# Patient Record
Sex: FEMALE | Race: OTHER | NOT HISPANIC OR LATINO | URBAN - METROPOLITAN AREA
[De-identification: names, ages, dates, MRNs, and addresses within clinical notes are randomized per-mention and may not be internally consistent; named-entity substitution may affect disease eponyms.]

---

## 2017-06-23 ENCOUNTER — EMERGENCY (EMERGENCY)
Facility: HOSPITAL | Age: 37
LOS: 1 days | Discharge: HOME | End: 2017-06-23
Attending: EMERGENCY MEDICINE | Admitting: EMERGENCY MEDICINE
Payer: MEDICAID

## 2017-06-23 VITALS
RESPIRATION RATE: 17 BRPM | SYSTOLIC BLOOD PRESSURE: 128 MMHG | DIASTOLIC BLOOD PRESSURE: 83 MMHG | OXYGEN SATURATION: 97 % | TEMPERATURE: 98 F | HEART RATE: 78 BPM

## 2017-06-23 VITALS
HEART RATE: 83 BPM | DIASTOLIC BLOOD PRESSURE: 79 MMHG | TEMPERATURE: 98 F | RESPIRATION RATE: 16 BRPM | SYSTOLIC BLOOD PRESSURE: 137 MMHG | OXYGEN SATURATION: 99 %

## 2017-06-23 LAB
ALBUMIN SERPL ELPH-MCNC: 4.2 G/DL — SIGNIFICANT CHANGE UP (ref 3.3–5)
ALP SERPL-CCNC: 54 U/L — SIGNIFICANT CHANGE UP (ref 40–120)
ALT FLD-CCNC: 23 U/L RC — SIGNIFICANT CHANGE UP (ref 10–45)
ANION GAP SERPL CALC-SCNC: 11 MMOL/L — SIGNIFICANT CHANGE UP (ref 5–17)
AST SERPL-CCNC: 20 U/L — SIGNIFICANT CHANGE UP (ref 10–40)
BASOPHILS # BLD AUTO: 0.1 K/UL — SIGNIFICANT CHANGE UP (ref 0–0.2)
BASOPHILS NFR BLD AUTO: 0.7 % — SIGNIFICANT CHANGE UP (ref 0–2)
BILIRUB SERPL-MCNC: 0.2 MG/DL — SIGNIFICANT CHANGE UP (ref 0.2–1.2)
BUN SERPL-MCNC: 11 MG/DL — SIGNIFICANT CHANGE UP (ref 7–23)
CALCIUM SERPL-MCNC: 9.5 MG/DL — SIGNIFICANT CHANGE UP (ref 8.4–10.5)
CHLORIDE SERPL-SCNC: 102 MMOL/L — SIGNIFICANT CHANGE UP (ref 96–108)
CO2 SERPL-SCNC: 25 MMOL/L — SIGNIFICANT CHANGE UP (ref 22–31)
CREAT SERPL-MCNC: 0.66 MG/DL — SIGNIFICANT CHANGE UP (ref 0.5–1.3)
EOSINOPHIL # BLD AUTO: 0.1 K/UL — SIGNIFICANT CHANGE UP (ref 0–0.5)
EOSINOPHIL NFR BLD AUTO: 1.1 % — SIGNIFICANT CHANGE UP (ref 0–6)
GLUCOSE SERPL-MCNC: 101 MG/DL — HIGH (ref 70–99)
HCT VFR BLD CALC: 35.9 % — SIGNIFICANT CHANGE UP (ref 34.5–45)
HGB BLD-MCNC: 12.3 G/DL — SIGNIFICANT CHANGE UP (ref 11.5–15.5)
LYMPHOCYTES # BLD AUTO: 3.9 K/UL — HIGH (ref 1–3.3)
LYMPHOCYTES # BLD AUTO: 39.8 % — SIGNIFICANT CHANGE UP (ref 13–44)
MAGNESIUM SERPL-MCNC: 2.2 MG/DL — SIGNIFICANT CHANGE UP (ref 1.6–2.6)
MCHC RBC-ENTMCNC: 27.1 PG — SIGNIFICANT CHANGE UP (ref 27–34)
MCHC RBC-ENTMCNC: 34.4 GM/DL — SIGNIFICANT CHANGE UP (ref 32–36)
MCV RBC AUTO: 78.8 FL — LOW (ref 80–100)
MONOCYTES # BLD AUTO: 0.6 K/UL — SIGNIFICANT CHANGE UP (ref 0–0.9)
MONOCYTES NFR BLD AUTO: 6.6 % — SIGNIFICANT CHANGE UP (ref 2–14)
NEUTROPHILS # BLD AUTO: 5 K/UL — SIGNIFICANT CHANGE UP (ref 1.8–7.4)
NEUTROPHILS NFR BLD AUTO: 51.8 % — SIGNIFICANT CHANGE UP (ref 43–77)
PHOSPHATE SERPL-MCNC: 4.4 MG/DL — SIGNIFICANT CHANGE UP (ref 2.5–4.5)
PLATELET # BLD AUTO: 266 K/UL — SIGNIFICANT CHANGE UP (ref 150–400)
POTASSIUM SERPL-MCNC: 4.3 MMOL/L — SIGNIFICANT CHANGE UP (ref 3.5–5.3)
POTASSIUM SERPL-SCNC: 4.3 MMOL/L — SIGNIFICANT CHANGE UP (ref 3.5–5.3)
PROT SERPL-MCNC: 7.3 G/DL — SIGNIFICANT CHANGE UP (ref 6–8.3)
RBC # BLD: 4.56 M/UL — SIGNIFICANT CHANGE UP (ref 3.8–5.2)
RBC # FLD: 13.6 % — SIGNIFICANT CHANGE UP (ref 10.3–14.5)
SODIUM SERPL-SCNC: 138 MMOL/L — SIGNIFICANT CHANGE UP (ref 135–145)
WBC # BLD: 9.7 K/UL — SIGNIFICANT CHANGE UP (ref 3.8–10.5)
WBC # FLD AUTO: 9.7 K/UL — SIGNIFICANT CHANGE UP (ref 3.8–10.5)

## 2017-06-23 PROCEDURE — 99285 EMERGENCY DEPT VISIT HI MDM: CPT | Mod: 25

## 2017-06-23 PROCEDURE — 80053 COMPREHEN METABOLIC PANEL: CPT

## 2017-06-23 PROCEDURE — 73630 X-RAY EXAM OF FOOT: CPT

## 2017-06-23 PROCEDURE — 93971 EXTREMITY STUDY: CPT | Mod: 26

## 2017-06-23 PROCEDURE — 83735 ASSAY OF MAGNESIUM: CPT

## 2017-06-23 PROCEDURE — 73630 X-RAY EXAM OF FOOT: CPT | Mod: 26,RT

## 2017-06-23 PROCEDURE — 93971 EXTREMITY STUDY: CPT

## 2017-06-23 PROCEDURE — 99284 EMERGENCY DEPT VISIT MOD MDM: CPT | Mod: 25

## 2017-06-23 PROCEDURE — 84100 ASSAY OF PHOSPHORUS: CPT

## 2017-06-23 PROCEDURE — 85027 COMPLETE CBC AUTOMATED: CPT

## 2017-06-23 RX ORDER — ACETAMINOPHEN 500 MG
975 TABLET ORAL ONCE
Qty: 0 | Refills: 0 | Status: COMPLETED | OUTPATIENT
Start: 2017-06-23 | End: 2017-06-23

## 2017-06-23 RX ADMIN — Medication 975 MILLIGRAM(S): at 02:16

## 2017-06-23 RX ADMIN — Medication 975 MILLIGRAM(S): at 05:58

## 2017-06-23 NOTE — ED PROVIDER NOTE - OBJECTIVE STATEMENT
37yo female PMH DM presenting with left-sided lower extremity numbness and tingling since yesterday morning. Recently started on diabetes and started on Debicon yesterday. Patient was sitting in school when she noticed that her left great toe felt tingling.     PMD: Nnamdi Ibrahim ([facility]) 37yo female PMH DM, HLD presenting with left-sided lower extremity numbness and tingling since yesterday morning. Recently started on diabetes and started on Debicon yesterday. Patient was sitting in school when she noticed that her left great toe felt numb, also c/o right ankle swelling. No fevers or chills, no chest pain or shortness of breath, no n/v/d. Denies trauma. Admits to standing a lot over last several days. No weakness. No back pain.     P[facility]: [facility]

## 2017-06-23 NOTE — ED PROVIDER NOTE - PROGRESS NOTE DETAILS
The patient has the capacity to refuse further medical evaluation and care. I had a lengthy discussion with the patient in which appropriate further evaluation and treatment was offered to the patient, and they declined. The patient understands that as a consequence of this decision they may be at risk for clinical deterioration including permanent disability and death, and the patient verbalized this understanding. Clear return precautions were discussed. The patient was urged to seek follow-up care, with appropriate referrals made as needed.

## 2017-06-23 NOTE — ED PROVIDER NOTE - MEDICAL DECISION MAKING DETAILS
35yo female PMH DM, hyperlipidemia recently started on Debicon presenting with isolated left great toe paresthesia and right ankle swelling, etiology likely 2/2 standing all day yesterday however will obtain bloodwork to r/o electrolyte abnormality, xray, ultrasound RLE, reassess. If negative workup follow up with PMD. Seema Eaton DO

## 2017-06-23 NOTE — ED PROVIDER NOTE - PHYSICAL EXAMINATION
Gen: NAD, AOx3  Head: NCAT  Lung: CTAB, no respiratory distress, no wheezing, rales, rhonchi  CV: normal s1/s2, rrr, no murmurs, Normal perfusion, pulses 2+ throughout  Abd: soft, NTND, no CVA tenderness  MSK: trace edema right ankle, no visible deformities, full range of motion in all 4 extremities  Neuro: CN II-XII grossly intact, No focal neurologic deficits, sensation intact, strength 5/5 bilateral lower extremities  Skin: No rash   Psych: normal affect

## 2017-06-23 NOTE — ED ADULT NURSE NOTE - CHPI ED SYMPTOMS NEG
no nausea/no weakness/no change in level of consciousness/no fever/no dizziness/no confusion/no blurred vision/no loss of consciousness/no vomiting

## 2017-06-23 NOTE — ED ADULT NURSE NOTE - OBJECTIVE STATEMENT
pt c/o numbness to both feet since yesterday while sitting upright in chair in class. No injury. pain to rt dorsalis pedal. decreased sensation to left toes compared to rt. No CP/SOB/Neck or back pain.

## 2017-06-23 NOTE — ED POST DISCHARGE NOTE - REASON FOR FOLLOW-UP
Other Pt presented c/o of numbness in great toe d/ilan at 7am on 6/23 nl foot xray and labs. Pt contacted ED regarding results of US. Informed pt of negative DVT study. Also stated that numbness has returned and is now in the back of the thigh, pt recently standing for extended period of time at graduation service. Informed that symptoms are most likely due to standing for a long time and to monitor  symptoms. Pt to follow up w/ PMD, if symptoms worsen or do not resolve w/ rest pt to return to ED.

## 2017-06-23 NOTE — ED PROVIDER NOTE - ATTENDING CONTRIBUTION TO CARE
36 yof hld, recent dx of dm started on oral dm med (unsure exactly which medication) presents with complaint of numbness to left foot in web space between great and second toe since yesterday. pt states she has been standing a lot in her flat shoes, has not been wearing sandals. denies injury or strain to the area. pt also reports right ankle swelling. no calf pain or swelling, no recent prolonged immobilization or travel. pt states she was worried it was related to her diabetes.     ROS:   constitutional - no fever, no chills  eyes - no visual changes, no redness  eent - no sore throat, no nasal congestion  cvs - no chest pain, no leg swelling  resp - no shortness of breath, no cough  gi - no abdominal pain, no vomiting, no diarrhea  gu - no dysuria, no hematuria  msk - no acute back pain, no joint swelling  skin - no rashes, no jaundice  neuro - no headache, no focal weakness  psych - no acute mental health issue     Physical Exam:   constitutional - well appearing, awake and alert, oriented x3  head - no external evidence of trauma  cvs - rrr, no murmurs, no peripheral edema. very minimal swelling of right ankle. no calf tendeness or palpable cords.   resp - breath sounds clear and equal bilat  gi - abdomen soft and nontender, no rigidity, guarding or rebound, bowel sounds present  msk - moving all extremities spontaneously  neuro - alert and oriented x3, no focal deficits, CNs 2-12 grossly intact. very minimal numbness to very small area of upper foot in web space between great and second toe. otherwise, sensation and motor intact to bilat feet. gait stable without ataxia. no pronator drift. strength in bilat upper ext wnl.   skin- no jaundice, warm and dry  psych - mood and affect wnl, no apparent risk to self or others     ? mild neuoropathy on left foot from standing in unsupportive shoes. mild nearly imperceptible swelling to right ankle - suspicion for dvt is low, however pt very concerned about it so will do dvt study.   xray left foot wnl, dvt study done but pending result. pt did not want to wait in ED for results of study and preferred to sign out ama. r/b/a of decision discussed including possible positive study requiring treatment, clinical worsening, death, or disability. pt currently with decision making capacity and able to understand r/b/a - still wants to sign out ama. additional verbal instructions regarding diagnosis, return precautions and follow up plan given to pt and/or family. JESSIKA Becerril MD

## 2019-02-07 ENCOUNTER — EMERGENCY (EMERGENCY)
Facility: HOSPITAL | Age: 39
LOS: 1 days | Discharge: HOME | End: 2019-02-07
Attending: EMERGENCY MEDICINE
Payer: COMMERCIAL

## 2019-02-07 VITALS
WEIGHT: 190.04 LBS | HEIGHT: 65 IN | RESPIRATION RATE: 16 BRPM | DIASTOLIC BLOOD PRESSURE: 80 MMHG | OXYGEN SATURATION: 100 % | TEMPERATURE: 98 F | HEART RATE: 92 BPM | SYSTOLIC BLOOD PRESSURE: 135 MMHG

## 2019-02-07 LAB
BASOPHILS # BLD AUTO: 0.1 K/UL — SIGNIFICANT CHANGE UP (ref 0–0.2)
BASOPHILS NFR BLD AUTO: 0.5 % — SIGNIFICANT CHANGE UP (ref 0–2)
EOSINOPHIL # BLD AUTO: 0.2 K/UL — SIGNIFICANT CHANGE UP (ref 0–0.5)
EOSINOPHIL NFR BLD AUTO: 1.5 % — SIGNIFICANT CHANGE UP (ref 0–6)
HCT VFR BLD CALC: 39.2 % — SIGNIFICANT CHANGE UP (ref 34.5–45)
HGB BLD-MCNC: 13.2 G/DL — SIGNIFICANT CHANGE UP (ref 11.5–15.5)
LYMPHOCYTES # BLD AUTO: 4.8 K/UL — HIGH (ref 1–3.3)
LYMPHOCYTES # BLD AUTO: 45.9 % — HIGH (ref 13–44)
MCHC RBC-ENTMCNC: 25.6 PG — LOW (ref 27–34)
MCHC RBC-ENTMCNC: 33.7 GM/DL — SIGNIFICANT CHANGE UP (ref 32–36)
MCV RBC AUTO: 76.1 FL — LOW (ref 80–100)
MONOCYTES # BLD AUTO: 0.6 K/UL — SIGNIFICANT CHANGE UP (ref 0–0.9)
MONOCYTES NFR BLD AUTO: 5.6 % — SIGNIFICANT CHANGE UP (ref 2–14)
NEUTROPHILS # BLD AUTO: 4.8 K/UL — SIGNIFICANT CHANGE UP (ref 1.8–7.4)
NEUTROPHILS NFR BLD AUTO: 46.5 % — SIGNIFICANT CHANGE UP (ref 43–77)
PLATELET # BLD AUTO: 308 K/UL — SIGNIFICANT CHANGE UP (ref 150–400)
RBC # BLD: 5.15 M/UL — SIGNIFICANT CHANGE UP (ref 3.8–5.2)
RBC # FLD: 14.2 % — SIGNIFICANT CHANGE UP (ref 10.3–14.5)
WBC # BLD: 10.3 K/UL — SIGNIFICANT CHANGE UP (ref 3.8–10.5)
WBC # FLD AUTO: 10.3 K/UL — SIGNIFICANT CHANGE UP (ref 3.8–10.5)

## 2019-02-07 PROCEDURE — 99281 EMR DPT VST MAYX REQ PHY/QHP: CPT

## 2019-02-07 PROCEDURE — 99284 EMERGENCY DEPT VISIT MOD MDM: CPT | Mod: 25

## 2019-02-07 NOTE — ED ADULT NURSE NOTE - OBJECTIVE STATEMENT
39 y/o female, a&o x3, c/o increased vaginal bleeding. Pt has had chronic vaginal bleeding x2 years, but today involves lower abd pain. Denies headache, weakness dizziness, fevers, chills, or chest pains. Breathing even, full, unlabored, no cough, no SOB. Abdomen soft, pain of lower quadrants, denies nausea/vomiting/constipation/diarrhea/urinary complaints. Skin warm/dry/intact, no edema. Stretcher locked in low position. Advised of plan of care. Family at bedside.

## 2019-02-07 NOTE — ED ADULT NURSE NOTE - NSIMPLEMENTINTERV_GEN_ALL_ED
Implemented All Universal Safety Interventions:  [place] to call system. Call bell, personal items and telephone within reach. Instruct patient to call for assistance. Room bathroom lighting operational. Non-slip footwear when patient is off stretcher. Physically safe environment: no spills, clutter or unnecessary equipment. Stretcher in lowest position, wheels locked, appropriate side rails in place.

## 2019-02-08 VITALS
OXYGEN SATURATION: 97 % | RESPIRATION RATE: 16 BRPM | DIASTOLIC BLOOD PRESSURE: 67 MMHG | HEART RATE: 72 BPM | SYSTOLIC BLOOD PRESSURE: 101 MMHG

## 2019-02-08 DIAGNOSIS — N93.9 ABNORMAL UTERINE AND VAGINAL BLEEDING, UNSPECIFIED: ICD-10-CM

## 2019-02-08 LAB
ALBUMIN SERPL ELPH-MCNC: 4.3 G/DL — SIGNIFICANT CHANGE UP (ref 3.3–5)
ALP SERPL-CCNC: 72 U/L — SIGNIFICANT CHANGE UP (ref 40–120)
ALT FLD-CCNC: 15 U/L — SIGNIFICANT CHANGE UP (ref 10–45)
ANION GAP SERPL CALC-SCNC: 14 MMOL/L — SIGNIFICANT CHANGE UP (ref 5–17)
APPEARANCE UR: CLEAR — SIGNIFICANT CHANGE UP
AST SERPL-CCNC: 14 U/L — SIGNIFICANT CHANGE UP (ref 10–40)
BACTERIA # UR AUTO: NEGATIVE — SIGNIFICANT CHANGE UP
BASOPHILS # BLD AUTO: 0.1 K/UL — SIGNIFICANT CHANGE UP (ref 0–0.2)
BASOPHILS NFR BLD AUTO: 0.9 % — SIGNIFICANT CHANGE UP (ref 0–2)
BILIRUB SERPL-MCNC: 0.1 MG/DL — LOW (ref 0.2–1.2)
BILIRUB UR-MCNC: NEGATIVE — SIGNIFICANT CHANGE UP
BLD GP AB SCN SERPL QL: NEGATIVE — SIGNIFICANT CHANGE UP
BUN SERPL-MCNC: 16 MG/DL — SIGNIFICANT CHANGE UP (ref 7–23)
CALCIUM SERPL-MCNC: 9.8 MG/DL — SIGNIFICANT CHANGE UP (ref 8.4–10.5)
CHLORIDE SERPL-SCNC: 100 MMOL/L — SIGNIFICANT CHANGE UP (ref 96–108)
CO2 SERPL-SCNC: 24 MMOL/L — SIGNIFICANT CHANGE UP (ref 22–31)
COLOR SPEC: SIGNIFICANT CHANGE UP
CREAT SERPL-MCNC: 0.68 MG/DL — SIGNIFICANT CHANGE UP (ref 0.5–1.3)
DIFF PNL FLD: ABNORMAL
EOSINOPHIL # BLD AUTO: 0.2 K/UL — SIGNIFICANT CHANGE UP (ref 0–0.5)
EOSINOPHIL NFR BLD AUTO: 2 % — SIGNIFICANT CHANGE UP (ref 0–6)
EPI CELLS # UR: 3 /HPF — SIGNIFICANT CHANGE UP
GLUCOSE SERPL-MCNC: 104 MG/DL — HIGH (ref 70–99)
GLUCOSE UR QL: NEGATIVE — SIGNIFICANT CHANGE UP
HCT VFR BLD CALC: 36.1 % — SIGNIFICANT CHANGE UP (ref 34.5–45)
HGB BLD-MCNC: 12.6 G/DL — SIGNIFICANT CHANGE UP (ref 11.5–15.5)
HYALINE CASTS # UR AUTO: 1 /LPF — SIGNIFICANT CHANGE UP (ref 0–2)
KETONES UR-MCNC: NEGATIVE — SIGNIFICANT CHANGE UP
LEUKOCYTE ESTERASE UR-ACNC: ABNORMAL
LYMPHOCYTES # BLD AUTO: 4.4 K/UL — HIGH (ref 1–3.3)
LYMPHOCYTES # BLD AUTO: 46.9 % — HIGH (ref 13–44)
MCHC RBC-ENTMCNC: 26.3 PG — LOW (ref 27–34)
MCHC RBC-ENTMCNC: 34.8 GM/DL — SIGNIFICANT CHANGE UP (ref 32–36)
MCV RBC AUTO: 75.5 FL — LOW (ref 80–100)
MONOCYTES # BLD AUTO: 0.5 K/UL — SIGNIFICANT CHANGE UP (ref 0–0.9)
MONOCYTES NFR BLD AUTO: 5 % — SIGNIFICANT CHANGE UP (ref 2–14)
NEUTROPHILS # BLD AUTO: 4.2 K/UL — SIGNIFICANT CHANGE UP (ref 1.8–7.4)
NEUTROPHILS NFR BLD AUTO: 45.1 % — SIGNIFICANT CHANGE UP (ref 43–77)
NITRITE UR-MCNC: NEGATIVE — SIGNIFICANT CHANGE UP
PH UR: 6 — SIGNIFICANT CHANGE UP (ref 5–8)
PLATELET # BLD AUTO: 301 K/UL — SIGNIFICANT CHANGE UP (ref 150–400)
POTASSIUM SERPL-MCNC: 4 MMOL/L — SIGNIFICANT CHANGE UP (ref 3.5–5.3)
POTASSIUM SERPL-SCNC: 4 MMOL/L — SIGNIFICANT CHANGE UP (ref 3.5–5.3)
PROT SERPL-MCNC: 7.5 G/DL — SIGNIFICANT CHANGE UP (ref 6–8.3)
PROT UR-MCNC: SIGNIFICANT CHANGE UP
RBC # BLD: 4.78 M/UL — SIGNIFICANT CHANGE UP (ref 3.8–5.2)
RBC # FLD: 13.7 % — SIGNIFICANT CHANGE UP (ref 10.3–14.5)
RBC CASTS # UR COMP ASSIST: 535 /HPF — HIGH (ref 0–4)
RH IG SCN BLD-IMP: POSITIVE — SIGNIFICANT CHANGE UP
RH IG SCN BLD-IMP: POSITIVE — SIGNIFICANT CHANGE UP
SODIUM SERPL-SCNC: 138 MMOL/L — SIGNIFICANT CHANGE UP (ref 135–145)
SP GR SPEC: 1.01 — SIGNIFICANT CHANGE UP (ref 1.01–1.02)
UROBILINOGEN FLD QL: NEGATIVE — SIGNIFICANT CHANGE UP
WBC # BLD: 9.3 K/UL — SIGNIFICANT CHANGE UP (ref 3.8–10.5)
WBC # FLD AUTO: 9.3 K/UL — SIGNIFICANT CHANGE UP (ref 3.8–10.5)
WBC UR QL: 12 /HPF — HIGH (ref 0–5)

## 2019-02-08 PROCEDURE — 81001 URINALYSIS AUTO W/SCOPE: CPT

## 2019-02-08 PROCEDURE — 76830 TRANSVAGINAL US NON-OB: CPT

## 2019-02-08 PROCEDURE — 86901 BLOOD TYPING SEROLOGIC RH(D): CPT

## 2019-02-08 PROCEDURE — 99284 EMERGENCY DEPT VISIT MOD MDM: CPT | Mod: 25

## 2019-02-08 PROCEDURE — 86900 BLOOD TYPING SEROLOGIC ABO: CPT

## 2019-02-08 PROCEDURE — 86850 RBC ANTIBODY SCREEN: CPT

## 2019-02-08 PROCEDURE — 80053 COMPREHEN METABOLIC PANEL: CPT

## 2019-02-08 PROCEDURE — 76830 TRANSVAGINAL US NON-OB: CPT | Mod: 26

## 2019-02-08 PROCEDURE — 85027 COMPLETE CBC AUTOMATED: CPT

## 2019-02-08 NOTE — ED PROVIDER NOTE - NS ED ROS FT
General: No fevers / chills  HENT: No head trauma, ear pain, runny nose, or sore throat  Eyes: No visual changes  CP: No chest pain, palpitations, or light headedness  Resp: No shortness of breath, no cough  GI: No abdominal pain, diarrhea, constipation, nausea, or vomiting  : +vaginal bleeding   Neuro: No numbness, tingling, or weakness

## 2019-02-08 NOTE — ED PROVIDER NOTE - PMH
Diabetes    No pertinent past medical history <<----- Click to add NO pertinent Past Medical History

## 2019-02-08 NOTE — CONSULT NOTE ADULT - SUBJECTIVE AND OBJECTIVE BOX
GYN Consult Note     38y  w/ LMP 2/5 and hx of dysfunctional uterine bleeding presents with increased vaginal bleeding and intermittent dizziness. Patient reports a two year history of heavy and frequent vaginal bleeding, occurring every 12-15 days and lasting 7-8 days. She reports on the heavier days she uses on average 8 pads per day. She reports being placed on OCP for which she is uncertain of the name, 3 months ago. While on OCP she was bleeding every 10 days but the bleeding was overall lighter. She self discontinued after 2 months due to frequent bleeding. She has not been on any other treatment for this and is due to follow up with her private GYN at the end of the month. She reports that yesterday the bleeding seemed heavier than it normally is and this concerned her so that she presented here. Reports intermittent dizziness. Denies any HA, CP/SOB, fevers, chills, abdominal pain, dysuria, constipation, diarrhea.     OB/GYN HISTORY:  - 3 FT uncomplicated C/S, 1 medical TOP. Hx of abnormal pap smears with colposcopy. Denies uterine fibroids, ovarian cysts.     Last Menstrual Period: 2 days ago     Name of GYN Physician: Dr. Saeed Bhatti      PAST MEDICAL & SURGICAL HISTORY:  Diabetes  Hyperlipidemia   History of C/S x3       REVIEW OF SYSTEMS  +vaginal bleeding, intermittent dizziness   Denies fevers, chills, CP/SOB, dysuria, constipation, diarrhea.         MEDICATIONS  (STANDING):    MEDICATIONS  (PRN):      Allergies  No Known Allergies    Intolerances      FAMILY HISTORY:  No pertinent family history in first degree relatives      Vital Signs Last 24 Hrs  T(C): 36.9 (2019 01:14), Max: 36.9 (2019 01:14)  T(F): 98.5 (2019 01:14), Max: 98.5 (2019 01:14)  HR: 92 (2019 22:30) (92 - 92)  BP: 135/80 (2019 22:30) (135/80 - 135/80)  BP(mean): --  RR: 20 (2019 01:14) (16 - 20)  SpO2: 100% (2019 01:14) (100% - 100%)    PHYSICAL EXAM:    Constitutional: alert and oriented x 3    Gastrointestinal: soft, non tender, no rebound or guarding     Genitourinary: NEFG  Cervix: closed/ long, no CMT  Uterus: normal size, non tender  Adnexa: non tender, no palpable masses        LABS:                        13.2   10.3  )-----------( 308      ( 2019 23:29 )             39.2         138  |  100  |  16  ----------------------------<  104<H>  4.0   |  24  |  0.68    Ca    9.8      2019 23:29    TPro  7.5  /  Alb  4.3  /  TBili  0.1<L>  /  DBili  x   /  AST  14  /  ALT  15  /  AlkPhos  72        Urinalysis Basic - ( 2019 00:58 )    Color: Light Yellow / Appearance: Clear / S.015 / pH: x  Gluc: x / Ketone: Negative  / Bili: Negative / Urobili: Negative   Blood: x / Protein: Trace / Nitrite: Negative   Leuk Esterase: Moderate / RBC: 535 /hpf / WBC 12 /HPF   Sq Epi: x / Non Sq Epi: 3 /hpf / Bacteria: Negative        RADIOLOGY & ADDITIONAL STUDIES: GYN Consult Note     38y  w/ LMP 2/5 and hx of dysfunctional uterine bleeding presents with increased vaginal bleeding. Patient reports a two year history of heavy and frequent vaginal bleeding, occurring every 12-15 days and lasting 7-8 days. She reports on the heavier days she uses on average 8 pads per day. She reports being placed on OCP for which she is uncertain of the name, 3 months ago. While on OCP she was bleeding every 10 days but the bleeding was overall lighter. She self discontinued after 2 months due to frequent bleeding. She has not been on any other treatment for this and is due to follow up with her private GYN at the end of the month. She reports that yesterday the bleeding seemed heavier than it normally is and this concerned her so that she presented here. Reports intermittent dizziness. Denies any HA, CP/SOB, fevers, chills, abdominal pain, dysuria, constipation, diarrhea.     OB/GYN HISTORY:  - 3 FT uncomplicated C/S, 1 medical TOP. Hx of abnormal pap smears with colposcopy. Denies uterine fibroids, ovarian cysts.     Last Menstrual Period: 2 days ago     Name of GYN Physician: Dr. Saeed Bhatti      PAST MEDICAL & SURGICAL HISTORY:  Hyperlipidemia   History of C/S x3       REVIEW OF SYSTEMS  +vaginal bleeding, intermittent dizziness   Denies fevers, chills, CP/SOB, dysuria, constipation, diarrhea.         MEDICATIONS  (STANDING):    MEDICATIONS  (PRN):      Allergies  No Known Allergies    Intolerances      FAMILY HISTORY:  No pertinent family history in first degree relatives      Vital Signs Last 24 Hrs  T(C): 36.9 (2019 01:14), Max: 36.9 (2019 01:14)  T(F): 98.5 (2019 01:14), Max: 98.5 (2019 01:14)  HR: 92 (2019 22:30) (92 - 92)  BP: 135/80 (2019 22:30) (135/80 - 135/80)  BP(mean): --  RR: 20 (2019 01:14) (16 - 20)  SpO2: 100% (2019 01:14) (100% - 100%)    PHYSICAL EXAM:    Constitutional: alert and oriented x 3    Gastrointestinal: soft, non tender, no rebound or guarding     Genitourinary: NEFG  Cervix: closed/ long, no CMT  Uterus: normal size, non tender  Adnexa: non tender, no palpable masses        LABS:                        13.2   10.3  )-----------( 308      ( 2019 23:29 )             39.2         138  |  100  |  16  ----------------------------<  104<H>  4.0   |  24  |  0.68    Ca    9.8      2019 23:29    TPro  7.5  /  Alb  4.3  /  TBili  0.1<L>  /  DBili  x   /  AST  14  /  ALT  15  /  AlkPhos  72        Urinalysis Basic - ( 2019 00:58 )    Color: Light Yellow / Appearance: Clear / S.015 / pH: x  Gluc: x / Ketone: Negative  / Bili: Negative / Urobili: Negative   Blood: x / Protein: Trace / Nitrite: Negative   Leuk Esterase: Moderate / RBC: 535 /hpf / WBC 12 /HPF   Sq Epi: x / Non Sq Epi: 3 /hpf / Bacteria: Negative        RADIOLOGY & ADDITIONAL STUDIES:  < from: US Transvaginal (19 @ 02:38) >  EXAM:  US TRANSVAGINAL                            PROCEDURE DATE:  2019            INTERPRETATION:  CLINICAL INFORMATION: Prolonged menses; vaginal bleeding    LMP: 2019    COMPARISON: CT the abdomen and pelvis acquired 2015    TECHNIQUE:     Endovaginal pelvic sonogram only. Color and Spectral Doppler was   performed.    FINDINGS:    Uterus: 9.3 x 4.6 x 6.3 cm. Within normal limits. There is a small   posterior uterine fibroid measuring 1.6 x 0.9 x 1.3 cm.    Endometrium: 13 mm. Within normal limits.    Right ovary: 2.2 x 1.6 x 2.5 cm. Within normal limits. Normal arterial   and venous flow to the ovary.    Left ovary: 2.7 x 1.8 x 2.5 cm. Within normal limits. Normal arterial and   venous flow to the ovary.    Fluid: None.    IMPRESSION:    Normal pelvic sonogram.        TYRELL WAHL M.D., RADIOLOGY RESIDENT  This document has been electronically signed.  MARILEE STEWART M.D., ATTENDING RADIOLOGIST  This document has been electronically signed. 2019  2:58AM       < end of copied text >

## 2019-02-08 NOTE — CONSULT NOTE ADULT - ATTENDING COMMENTS
This 39y/o  who presented to the ED with heavy vaginal bleeding x2 days, was discussed with me.  I have read and reviewed the above Resident's note and I agree with the assessment and plan.    Pt with hx of Abnormal uterine bleeding, most likely endometrial in nature.  Pt was placed on OCP's, which she has discontinued and is most likely why her menstrual bleeding  is heavy at this time.  H/H wnl and stable.  No active bleeding noted on exam.  Small posterior fibroid noted on sono, but most likely not cause of heavy bleeding.    Pt is stable for discharge with bleeding precautions given.  Pt to f/u with her GYN and recommend restarting OCP's or using Mirena IUD to help with heavy menses.    Thank you for the consult and please call if any further questions.    Dr. Chavez

## 2019-02-08 NOTE — ED PROVIDER NOTE - OBJECTIVE STATEMENT
39 yo  hx of dysfunctional uterine bleeding without known cause presents due to increase in bleeding. Stopped taking oral contraception 1 month ago due to side effects-now has increase in bleeding especially over the last 2 days. Soaking 9 pads daily. Denies feeling dizzy, cp, sob, headache-however on orthostatics, became tachycardic and felt dizzy.

## 2019-02-08 NOTE — ED PROVIDER NOTE - ATTENDING CONTRIBUTION TO CARE
38 yof pmhx DUB prev on OCP however stopped them without telling her obgyn as she 'didn't like the side effects' incl headache, though she does endorse they were helping w her sxs. states over last two years has had intermittent vb approx every 10 days. states bleeding became heaviery two days ago after having sexual intercourse, assoc w mild to mod pelvic cramping. no f/c. no  abnormal discharge besides bleeding. statse has had prior pelvic sono 4 mo ago which was 'totally normal' per pt , and has f/u w obgyn in one week w repeat sono planned.     ROS:   constitutional - no fever, no chills  eyes - no visual changes, no redness  eent - no sore throat, no nasal congestion  cvs - no chest pain, no leg swelling  resp - no shortness of breath, no cough  gi - no abdominal pain, no vomiting, no diarrhea  gu - no dysuria, no hematuria  msk - no acute back pain, no joint swelling  skin - no rashes, no jaundice  neuro - no headache, no focal weakness    Physical Exam:   constitutional - well appearing, awake and alert, oriented x3  head - no external evidence of trauma  cvs - rrr, no murmurs, no peripheral edema  resp - breath sounds clear and equal bilat  gi - abdomen soft and nontender, no rigidity, guarding or rebound, bowel sounds present  msk - moving all extremities spontaneously  neuro - alert and oriented x3, no focal deficits, CNs 2-12 grossly intact  skin- no jaundice, warm and dry  psych - mood and affect wnl, no apparent risk to self or others   gu - some pooling of blood in posterior fornix, os closed, no t or adnexal tenderness.     has bilat tubes tied.   ? DUB most likely, vs endometrial pathology/ malignancy (less liekly as prior US normal) vs vaginal laceration given recent sexual activity.    US nonactionable.   seen by ob who are recommending against starting provera at this time as pt has close f/u in next week. was mildly orthostatic, however  ambulating around ED w/o diff and rpt CBC no signif drop in h/h .additional verbal instructions regarding diagnosis, return precautions and follow up plan given to pt and/or family. JESSIKA Becerril MD

## 2019-02-08 NOTE — CONSULT NOTE ADULT - PROBLEM SELECTOR RECOMMENDATION 9
- No active hemorrhage on exam, Hb/Hct 13.2/39.2  - TVUS with evidence of small posterior fibroid   - Increased bleeding likely secondary to fibroid and self d/c of OCP   - Recommend patient follow up with private GYN to discuss further treatment, as she self discontinued OCP. No acute intervention warranted at this time.    D/w Dr. Hilda Luke, PGY-2 - No active hemorrhage on exam, Hb/Hct 13.2/39.2  - TVUS with evidence of small posterior fibroid   - Increased bleeding likely secondary to self d/c of OCP   - Recommend patient follow up with private GYN to discuss further treatment, as she self discontinued OCP. No acute intervention warranted at this time.    D/w Dr. Hilda Luke, PGY-2

## 2019-02-08 NOTE — ED ADULT NURSE REASSESSMENT NOTE - NS ED NURSE REASSESS COMMENT FT1
Performed orthostatic vital signs measurement. Pt reported feeling slight dizziness when transitioning from sitting to standing position. Skin warm/dry, appropriate color for ethnicity. No syncope. No weakness. No chest pains. No palpitations. Stretcher locked in low position. Advised of plan of care.

## 2019-02-08 NOTE — ED PROVIDER NOTE - MEDICAL DECISION MAKING DETAILS
dysfunctional uterine bleeding worsened over the last 2 days after stopping ocps recently, pooling of blood in the vault, TVUS/OB pending   Elenita Russo, PGY-2 EM

## 2019-02-08 NOTE — CONSULT NOTE ADULT - ASSESSMENT
38y  w/ LMP 2/5 and hx of dysfunctional uterine bleeding presents with increased vaginal bleeding and intermittent dizziness, in stable condition.

## 2019-02-08 NOTE — ED PROVIDER NOTE - CARE PLAN
Principal Discharge DX:	Vaginal bleeding Principal Discharge DX:	Vaginal bleeding  Secondary Diagnosis:	Dysfunctional uterine bleeding

## 2019-03-02 ENCOUNTER — EMERGENCY (EMERGENCY)
Facility: HOSPITAL | Age: 39
LOS: 1 days | Discharge: HOME | End: 2019-03-02
Attending: EMERGENCY MEDICINE
Payer: COMMERCIAL

## 2019-03-02 VITALS
DIASTOLIC BLOOD PRESSURE: 73 MMHG | RESPIRATION RATE: 18 BRPM | OXYGEN SATURATION: 100 % | TEMPERATURE: 99 F | SYSTOLIC BLOOD PRESSURE: 113 MMHG | HEART RATE: 89 BPM

## 2019-03-02 VITALS
HEIGHT: 66 IN | RESPIRATION RATE: 20 BRPM | SYSTOLIC BLOOD PRESSURE: 151 MMHG | HEART RATE: 99 BPM | OXYGEN SATURATION: 99 % | TEMPERATURE: 98 F | WEIGHT: 192.02 LBS | DIASTOLIC BLOOD PRESSURE: 99 MMHG

## 2019-03-02 LAB
ALBUMIN SERPL ELPH-MCNC: 4.4 G/DL — SIGNIFICANT CHANGE UP (ref 3.3–5)
ALP SERPL-CCNC: 57 U/L — SIGNIFICANT CHANGE UP (ref 40–120)
ALT FLD-CCNC: 13 U/L — SIGNIFICANT CHANGE UP (ref 10–45)
ANION GAP SERPL CALC-SCNC: 14 MMOL/L — SIGNIFICANT CHANGE UP (ref 5–17)
APTT BLD: 31.7 SEC — SIGNIFICANT CHANGE UP (ref 27.5–36.3)
AST SERPL-CCNC: 15 U/L — SIGNIFICANT CHANGE UP (ref 10–40)
BASOPHILS # BLD AUTO: 0.1 K/UL — SIGNIFICANT CHANGE UP (ref 0–0.2)
BASOPHILS NFR BLD AUTO: 0.6 % — SIGNIFICANT CHANGE UP (ref 0–2)
BILIRUB SERPL-MCNC: 0.1 MG/DL — LOW (ref 0.2–1.2)
BLD GP AB SCN SERPL QL: NEGATIVE — SIGNIFICANT CHANGE UP
BUN SERPL-MCNC: 7 MG/DL — SIGNIFICANT CHANGE UP (ref 7–23)
CALCIUM SERPL-MCNC: 9.7 MG/DL — SIGNIFICANT CHANGE UP (ref 8.4–10.5)
CHLORIDE SERPL-SCNC: 104 MMOL/L — SIGNIFICANT CHANGE UP (ref 96–108)
CO2 SERPL-SCNC: 24 MMOL/L — SIGNIFICANT CHANGE UP (ref 22–31)
CREAT SERPL-MCNC: 0.59 MG/DL — SIGNIFICANT CHANGE UP (ref 0.5–1.3)
EOSINOPHIL # BLD AUTO: 0.1 K/UL — SIGNIFICANT CHANGE UP (ref 0–0.5)
EOSINOPHIL NFR BLD AUTO: 1.1 % — SIGNIFICANT CHANGE UP (ref 0–6)
GLUCOSE SERPL-MCNC: 128 MG/DL — HIGH (ref 70–99)
HCG SERPL-ACNC: <2 MIU/ML — SIGNIFICANT CHANGE UP
HCT VFR BLD CALC: 35.9 % — SIGNIFICANT CHANGE UP (ref 34.5–45)
HGB BLD-MCNC: 12.4 G/DL — SIGNIFICANT CHANGE UP (ref 11.5–15.5)
INR BLD: 0.92 RATIO — SIGNIFICANT CHANGE UP (ref 0.88–1.16)
LYMPHOCYTES # BLD AUTO: 3.6 K/UL — HIGH (ref 1–3.3)
LYMPHOCYTES # BLD AUTO: 36.7 % — SIGNIFICANT CHANGE UP (ref 13–44)
MCHC RBC-ENTMCNC: 26.4 PG — LOW (ref 27–34)
MCHC RBC-ENTMCNC: 34.6 GM/DL — SIGNIFICANT CHANGE UP (ref 32–36)
MCV RBC AUTO: 76.3 FL — LOW (ref 80–100)
MONOCYTES # BLD AUTO: 0.6 K/UL — SIGNIFICANT CHANGE UP (ref 0–0.9)
MONOCYTES NFR BLD AUTO: 5.8 % — SIGNIFICANT CHANGE UP (ref 2–14)
NEUTROPHILS # BLD AUTO: 5.5 K/UL — SIGNIFICANT CHANGE UP (ref 1.8–7.4)
NEUTROPHILS NFR BLD AUTO: 55.8 % — SIGNIFICANT CHANGE UP (ref 43–77)
PLATELET # BLD AUTO: 271 K/UL — SIGNIFICANT CHANGE UP (ref 150–400)
POTASSIUM SERPL-MCNC: 3.6 MMOL/L — SIGNIFICANT CHANGE UP (ref 3.5–5.3)
POTASSIUM SERPL-SCNC: 3.6 MMOL/L — SIGNIFICANT CHANGE UP (ref 3.5–5.3)
PROT SERPL-MCNC: 7.9 G/DL — SIGNIFICANT CHANGE UP (ref 6–8.3)
PROTHROM AB SERPL-ACNC: 10.5 SEC — SIGNIFICANT CHANGE UP (ref 10–12.9)
RBC # BLD: 4.71 M/UL — SIGNIFICANT CHANGE UP (ref 3.8–5.2)
RBC # FLD: 14 % — SIGNIFICANT CHANGE UP (ref 10.3–14.5)
RH IG SCN BLD-IMP: POSITIVE — SIGNIFICANT CHANGE UP
SODIUM SERPL-SCNC: 142 MMOL/L — SIGNIFICANT CHANGE UP (ref 135–145)
WBC # BLD: 9.8 K/UL — SIGNIFICANT CHANGE UP (ref 3.8–10.5)
WBC # FLD AUTO: 9.8 K/UL — SIGNIFICANT CHANGE UP (ref 3.8–10.5)

## 2019-03-02 PROCEDURE — 70496 CT ANGIOGRAPHY HEAD: CPT

## 2019-03-02 PROCEDURE — 93010 ELECTROCARDIOGRAM REPORT: CPT

## 2019-03-02 PROCEDURE — 99284 EMERGENCY DEPT VISIT MOD MDM: CPT | Mod: 25

## 2019-03-02 PROCEDURE — 85610 PROTHROMBIN TIME: CPT

## 2019-03-02 PROCEDURE — 70496 CT ANGIOGRAPHY HEAD: CPT | Mod: 26

## 2019-03-02 PROCEDURE — 86901 BLOOD TYPING SEROLOGIC RH(D): CPT

## 2019-03-02 PROCEDURE — 70498 CT ANGIOGRAPHY NECK: CPT

## 2019-03-02 PROCEDURE — 70498 CT ANGIOGRAPHY NECK: CPT | Mod: 26

## 2019-03-02 PROCEDURE — 86900 BLOOD TYPING SEROLOGIC ABO: CPT

## 2019-03-02 PROCEDURE — 85730 THROMBOPLASTIN TIME PARTIAL: CPT

## 2019-03-02 PROCEDURE — 93005 ELECTROCARDIOGRAM TRACING: CPT

## 2019-03-02 PROCEDURE — 70450 CT HEAD/BRAIN W/O DYE: CPT

## 2019-03-02 PROCEDURE — 80053 COMPREHEN METABOLIC PANEL: CPT

## 2019-03-02 PROCEDURE — 85027 COMPLETE CBC AUTOMATED: CPT

## 2019-03-02 PROCEDURE — 86850 RBC ANTIBODY SCREEN: CPT

## 2019-03-02 PROCEDURE — 84702 CHORIONIC GONADOTROPIN TEST: CPT

## 2019-03-02 NOTE — ED ADULT NURSE NOTE - NSIMPLEMENTINTERV_GEN_ALL_ED
Implemented All Universal Safety Interventions:  [place] to call system. Call bell, personal items and telephone within reach. Instruct patient to call for assistance. Room bathroom lighting operational. Non-slip footwear when patient is off stretcher. Physically safe environment: no spills, clutter or unnecessary equipment. Stretcher in lowest position, wheels locked, appropriate side rails in place.

## 2019-03-02 NOTE — CONSULT NOTE ADULT - SUBJECTIVE AND OBJECTIVE BOX
HPI:    Patient is a 72F with a history of HLD, DM who presents to the ED due to dizziness and vision changes. Patient states that this afternoon, she had just finished eating when she started to feel dizzy. She denies any sensation of room-spinning and states that it was more a sensation of uneasiness. She also started to note tunnel vision that began shortly after this and felt that she had to focus to see objects and that objects in the periphery appeared more blurred. She says that her symptoms resolved upon laying down. She says that a few days ago, she was driving and had a similar sensation of unsteadiness without any visual problems at that time that resolved after pulling over and resting. She has been under some stress recently and has been experiencing headaches, although she denies any headache currently. She denies any paresthesias, focal weakness, or speech problems. She now states that she feels back to normal.       MEDICATIONS  (STANDING):    MEDICATIONS  (PRN):    PAST MEDICAL & SURGICAL HISTORY:  Prediabetes  Hyperlipidemia  No significant past surgical history    FAMILY HISTORY:  No pertinent family history in first degree relatives    Allergies    No Known Allergies    SHx - No smoking, No ETOH, No drug abuse      Review of Systems:  CONSTITUTIONAL:   HEENT:  No visual loss, blurred vision, double vision.  No hearing loss, sneezing, congestion, runny nose or sore throat.  SKIN:  No rash or itching.  CARDIOVASCULAR:  No chest pain, chest pressure or chest discomfort. No palpitations or edema.  RESPIRATORY:  No shortness of breath, cough or sputum.  GASTROINTESTINAL:  No anorexia, nausea, vomiting or diarrhea. No abdominal pain.  GENITOURINARY:  NO dysuria. No increased frequency. No retention. No incontinence.  NEUROLOGICAL:  See HPI  MUSCULOSKELETAL:  No muscle, back pain, joint pain or stiffness.  HEMATOLOGIC:  No anemia, bleeding or bruising.  PSYCHIATRIC:    ENDOCRINOLOGIC:  No reports of sweating, cold or heat intolerance. No polyuria or polydipsia.        Vital Signs Last 24 Hrs  T(C): 37.2 (02 Mar 2019 18:11), Max: 37.2 (02 Mar 2019 18:11)  T(F): 98.9 (02 Mar 2019 18:11), Max: 98.9 (02 Mar 2019 18:11)  HR: 96 (02 Mar 2019 19:13) (84 - 99)  BP: 124/79 (02 Mar 2019 19:13) (121/77 - 151/99)  BP(mean): --  RR: 18 (02 Mar 2019 19:13) (18 - 20)  SpO2: 100% (02 Mar 2019 19:13) (99% - 100%)    General Exam:   General appearance: No acute distress              Neurological Exam:  Mental Status: Orientated to self, date and place.  Attention intact.  No dysarthria. Speech fluent.  Cranial Nerves:   PERRL, EOMI, VFF, no nystagmus.    CN V1-3 intact to light touch .  No facial asymmetry.   Motor:   Tone: normal.                  Strength:     [] Upper extremity                      Delt       Bicep    Tricep                                                  R         5/5        5/5        5/5       5/5                                               L          5/5        5/5        5/5       5/5  [] Lower extremity                       HF          KE          KF        DF         PF                                               R        5/5        5/5        5/5       5/5       5/5                                               L         5/5        5/5       5/5       5/5        5/5  Pronator drift: none                 Dysmetria: None to finger-nose-finger    Sensation: intact to light touch    Deep Tendon Reflexes:     Biceps          Triceps      BR        Patellar        Ankle         Babinski                                         R       2+                   2+           2+            2+               2+           downgoing                                         L        2+                  2+           2+            2+               2+           downgoing      03-02    142  |  104  |  7   ----------------------------<  128<H>  3.6   |  24  |  0.59    Ca    9.7      02 Mar 2019 18:15    TPro  7.9  /  Alb  4.4  /  TBili  0.1<L>  /  DBili  x   /  AST  15  /  ALT  13  /  AlkPhos  57  03-02                            12.4   9.8   )-----------( 271      ( 02 Mar 2019 18:15 )             35.9       Radiology    < from: CT Head No Cont (03.02.19 @ 20:45) >  IMPRESSION:     CT brain: There is no evidence of acute intracranial hemorrhage,   extra-axial collection, vasogenic edema, mass, mass effect, midline   shift, central herniation, hydrocephalus or transcortical infarct.     CT angiography neck: No evidence of hemodynamically significant stenosis   by NASCET criteria. No evidence of vascular dissection.    CT angiography brain: No major vessel occlusion or proximal stenosis by   NASCET criteria. No evidence of aneurysm or other vascular malformation.    < end of copied text >

## 2019-03-02 NOTE — ED PROVIDER NOTE - PROGRESS NOTE DETAILS
Tong: patient received in sign-out. CTA negative. Neuro consulted, will see patient shortly. Patient amenable to staying in CDU for MRI. Beltran: neuro saw patient, recommending discharge. stable for discharge.

## 2019-03-02 NOTE — ED PROVIDER NOTE - ATTENDING CONTRIBUTION TO CARE
38 F p/w vision changes, sudden onset earlier today, no loc/syncope; + ha (mild gradual onset). no fever/chills.    On Physical Exam:  General: well appearing, in NAD, speaking clearly in full sentences and without difficulty; cooperative with exam  HEENT: PERRL, MMM  Neck: no neck tenderness, no nuchal rigidity  Cardiac: normal s1, s2; RRR; no MGR  Lungs: CTABL  Abdomen: soft nontender/nondistended  : no bladder tenderness or distension  Skin: intact, no rash  Extremities: no peripheral edema, no gross deformities  Neuro: no gross neurologic deficits    AP: Plan CT-head, CTA-head/neck to eval for posterior stroke or VBI; ECG,  labs, and consider neuro consult.

## 2019-03-02 NOTE — ED PROVIDER NOTE - OBJECTIVE STATEMENT
38F hx of HLD, pre-diabetes who presents with acute onset peripheral vision loss (right eye worse than left) and dizziness. Symptoms started 1.5 hours prior to ED visit when patient noticed that she had peripheral visual field defects on both sides, worse on right eye. This was associated with dizziness which worsened upon looking to the side. She noted that she became concerned when she was only able to see parts of her son's face (nose, mouth, chin). Symptoms improved by the time she presented to ED, however have not completely resolved. She denies any prior symptoms similar to this. Of note she has been having intermittent bilateral headaches, she has been seeing flashes of light on her right eye, and currently has occipital area numbness. She denies other focal neurological deficits, fevers, chills, cp, sob, abdominal pain, n/v/d, dysuria.

## 2019-03-02 NOTE — ED ADULT NURSE NOTE - CAS EDN DISCHARGE INTERVENTIONS
IV discontinued, cath removed intact Modified Advancement Flap Text: The defect edges were debeveled with a #15 scalpel blade.  Given the location of the defect, shape of the defect and the proximity to free margins a modified advancement flap was deemed most appropriate.  Using a sterile surgical marker, an appropriate advancement flap was drawn incorporating the defect and placing the expected incisions within the relaxed skin tension lines where possible.    The area thus outlined was incised deep to adipose tissue with a #15 scalpel blade.  The skin margins were undermined to an appropriate distance in all directions utilizing iris scissors.

## 2019-03-02 NOTE — ED PROVIDER NOTE - CLINICAL SUMMARY MEDICAL DECISION MAKING FREE TEXT BOX
38F hx of HLD, pre-diabetes who presents with acute onset peripheral vision loss (right eye worse than left) and dizziness, with acute onset 1.5 hours prior to ED visit. Improvement of symptoms by the time patient presented to ED. DDx - Migraine headache, lower suspicion for CVA however given occipital area numbness, and atypical complaints of right peripheral vision loss will obtain CT head w and w/o contrast and CT neck with contrast. Visual acuity test. Labs. Re-assess. 38F hx of HLD, pre-diabetes who presents with acute onset peripheral vision loss (right eye worse than left) and dizziness, with acute onset 1.5 hours prior to ED visit. Improvement of symptoms by the time patient presented to ED. DDx - Migraine headache, lower suspicion for CVA however given occipital area numbness, and atypical complaints of right peripheral vision loss will obtain CT head w and w/o contrast and CT neck with contrast. Visual acuity test 20/20 bilaterally, slit lamp and fundoscopic exam unremarkable. Labs. Re-assess.

## 2019-03-02 NOTE — CONSULT NOTE ADULT - ASSESSMENT
Patient is a 72F with a history of HLD, DM who presents to the ED due to dizziness and vision changes. Patient states that this afternoon, she had just finished eating when she started to feel dizzy. She denies any sensation of room-spinning and states that it was more a sensation of uneasiness. She also started to note tunnel vision that began shortly after this and felt that she had to focus to see objects and that objects in the periphery appeared more blurred. She says that her symptoms resolved upon laying down. She says that a few days ago, she was driving and had a similar sensation of unsteadiness without any visual problems at that time that resolved after pulling over and resting. She has been under some stress recently and has been experiencing headaches, although she denies any headache currently. She denies any paresthesias, focal weakness, or speech problems. She now states that she feels back to normal.     Neurological exam is unremarkable. Vital signs have been within normal limits. CTH and CTA head/neck were performed and showed no acute abnormalities.     Impression and recommendations: Symptoms could be presyncopal in nature. Patient does not need further inpatient neurological work-up as an inpatient and can follow-up outpatient with neurology if her symptoms return (call [phone] for appointment). Encourage good PO intake/hydration.

## 2019-03-02 NOTE — ED ADULT NURSE NOTE - OBJECTIVE STATEMENT
Pt is an ambulatory 74 yr old female a/oX 3 c/o peripheral vision loss in both eyes, associated with right sided head and facial numbness with dizziness and unsteady gait that started at [id number].  PERRL wnl, dillon with equal strength.  Normal steady gait upon arrival.  Visual deficits have resolved.  LUNGS CTA no chest pain or sob.  SINUS tachycardia on cm at 110 bpm.  Denies PE risk factors, c/o N/v/F, chills.  Abdomen NT ND.  No urinary symptoms.  Peripheral pulses +2bl no edema

## 2019-03-02 NOTE — CONSULT NOTE ADULT - ATTENDING COMMENTS
I spoke with the resident by phone about this patient but the patient was discharged before I could personally evaluate.

## 2019-03-02 NOTE — ED PROVIDER NOTE - PHYSICAL EXAMINATION
Grossly normal fundoscopic exam, without retinal hemorrhage or evidence of papilledema (limited, undilated exam).   Normal slit lamp exam.  Fluorescein stain revealed no areas of uptake.  No visual field deficits on confrontation visual exam.

## 2019-03-02 NOTE — ED PROVIDER NOTE - NSFOLLOWUPINSTRUCTIONS_ED_ALL_ED_FT
Your diagnosis: vision changes    Discharge instructions:    1. If your symptoms persist or worsen, call [phone] for appointment.    2. Take any prescribed medications as instructed:    3. Others:    4. Be sure to return to the ED if you develop new or worsening symptoms. Specific signs and symptoms to be vigilant of: lightheadedness, dizziness, vertigo, headache, vision changes, slurring of the speech, facial droop, difficulty swallowing, numbness or tingling, muscle weakness, changes in sensation, difficulty walking.

## 2019-03-03 PROBLEM — E11.9 TYPE 2 DIABETES MELLITUS WITHOUT COMPLICATIONS: Chronic | Status: INACTIVE | Noted: 2017-06-23 | Resolved: 2019-03-02

## 2019-03-07 ENCOUNTER — EMERGENCY (EMERGENCY)
Facility: HOSPITAL | Age: 39
LOS: 1 days | Discharge: HOME | End: 2019-03-07
Attending: EMERGENCY MEDICINE
Payer: COMMERCIAL

## 2019-03-07 VITALS
WEIGHT: 190.04 LBS | DIASTOLIC BLOOD PRESSURE: 82 MMHG | SYSTOLIC BLOOD PRESSURE: 131 MMHG | TEMPERATURE: 98 F | OXYGEN SATURATION: 100 % | RESPIRATION RATE: 18 BRPM | HEART RATE: 85 BPM | HEIGHT: 66 IN

## 2019-03-07 PROBLEM — E78.5 HYPERLIPIDEMIA, UNSPECIFIED: Chronic | Status: ACTIVE | Noted: 2019-03-02

## 2019-03-07 PROBLEM — R73.03 PREDIABETES: Chronic | Status: ACTIVE | Noted: 2019-03-02

## 2019-03-07 LAB
ALBUMIN SERPL ELPH-MCNC: 4 G/DL — SIGNIFICANT CHANGE UP (ref 3.3–5)
ALP SERPL-CCNC: 53 U/L — SIGNIFICANT CHANGE UP (ref 40–120)
ALT FLD-CCNC: 14 U/L — SIGNIFICANT CHANGE UP (ref 10–45)
ANION GAP SERPL CALC-SCNC: 13 MMOL/L — SIGNIFICANT CHANGE UP (ref 5–17)
AST SERPL-CCNC: 14 U/L — SIGNIFICANT CHANGE UP (ref 10–40)
BASOPHILS # BLD AUTO: 0 K/UL — SIGNIFICANT CHANGE UP (ref 0–0.2)
BASOPHILS NFR BLD AUTO: 0.5 % — SIGNIFICANT CHANGE UP (ref 0–2)
BILIRUB SERPL-MCNC: 0.1 MG/DL — LOW (ref 0.2–1.2)
BUN SERPL-MCNC: 9 MG/DL — SIGNIFICANT CHANGE UP (ref 7–23)
CALCIUM SERPL-MCNC: 9.7 MG/DL — SIGNIFICANT CHANGE UP (ref 8.4–10.5)
CHLORIDE SERPL-SCNC: 103 MMOL/L — SIGNIFICANT CHANGE UP (ref 96–108)
CO2 SERPL-SCNC: 24 MMOL/L — SIGNIFICANT CHANGE UP (ref 22–31)
CREAT SERPL-MCNC: 0.49 MG/DL — LOW (ref 0.5–1.3)
EOSINOPHIL # BLD AUTO: 0.1 K/UL — SIGNIFICANT CHANGE UP (ref 0–0.5)
EOSINOPHIL NFR BLD AUTO: 1.7 % — SIGNIFICANT CHANGE UP (ref 0–6)
GLUCOSE SERPL-MCNC: 126 MG/DL — HIGH (ref 70–99)
HCG SERPL-ACNC: <2 MIU/ML — SIGNIFICANT CHANGE UP
HCT VFR BLD CALC: 37.9 % — SIGNIFICANT CHANGE UP (ref 34.5–45)
HGB BLD-MCNC: 13.1 G/DL — SIGNIFICANT CHANGE UP (ref 11.5–15.5)
LYMPHOCYTES # BLD AUTO: 2.9 K/UL — SIGNIFICANT CHANGE UP (ref 1–3.3)
LYMPHOCYTES # BLD AUTO: 35.8 % — SIGNIFICANT CHANGE UP (ref 13–44)
MCHC RBC-ENTMCNC: 27.1 PG — SIGNIFICANT CHANGE UP (ref 27–34)
MCHC RBC-ENTMCNC: 34.6 GM/DL — SIGNIFICANT CHANGE UP (ref 32–36)
MCV RBC AUTO: 78.4 FL — LOW (ref 80–100)
MONOCYTES # BLD AUTO: 0.4 K/UL — SIGNIFICANT CHANGE UP (ref 0–0.9)
MONOCYTES NFR BLD AUTO: 5.3 % — SIGNIFICANT CHANGE UP (ref 2–14)
NEUTROPHILS # BLD AUTO: 4.5 K/UL — SIGNIFICANT CHANGE UP (ref 1.8–7.4)
NEUTROPHILS NFR BLD AUTO: 56.7 % — SIGNIFICANT CHANGE UP (ref 43–77)
PLATELET # BLD AUTO: 270 K/UL — SIGNIFICANT CHANGE UP (ref 150–400)
POTASSIUM SERPL-MCNC: 4.4 MMOL/L — SIGNIFICANT CHANGE UP (ref 3.5–5.3)
POTASSIUM SERPL-SCNC: 4.4 MMOL/L — SIGNIFICANT CHANGE UP (ref 3.5–5.3)
PROT SERPL-MCNC: 7.4 G/DL — SIGNIFICANT CHANGE UP (ref 6–8.3)
RBC # BLD: 4.83 M/UL — SIGNIFICANT CHANGE UP (ref 3.8–5.2)
RBC # FLD: 14.3 % — SIGNIFICANT CHANGE UP (ref 10.3–14.5)
SODIUM SERPL-SCNC: 140 MMOL/L — SIGNIFICANT CHANGE UP (ref 135–145)
WBC # BLD: 8 K/UL — SIGNIFICANT CHANGE UP (ref 3.8–10.5)
WBC # FLD AUTO: 8 K/UL — SIGNIFICANT CHANGE UP (ref 3.8–10.5)

## 2019-03-07 PROCEDURE — 76512 OPH US DX B-SCAN: CPT | Mod: 26,LT

## 2019-03-07 PROCEDURE — 99218: CPT

## 2019-03-07 RX ORDER — ACETAMINOPHEN 500 MG
650 TABLET ORAL ONCE
Qty: 0 | Refills: 0 | Status: COMPLETED | OUTPATIENT
Start: 2019-03-07 | End: 2019-03-07

## 2019-03-07 RX ORDER — METOCLOPRAMIDE HCL 10 MG
10 TABLET ORAL EVERY 6 HOURS
Qty: 0 | Refills: 0 | Status: DISCONTINUED | OUTPATIENT
Start: 2019-03-07 | End: 2019-03-11

## 2019-03-07 RX ORDER — ONDANSETRON 8 MG/1
4 TABLET, FILM COATED ORAL EVERY 4 HOURS
Qty: 0 | Refills: 0 | Status: DISCONTINUED | OUTPATIENT
Start: 2019-03-07 | End: 2019-03-11

## 2019-03-07 RX ORDER — METOCLOPRAMIDE HCL 10 MG
10 TABLET ORAL ONCE
Qty: 0 | Refills: 0 | Status: COMPLETED | OUTPATIENT
Start: 2019-03-07 | End: 2019-03-07

## 2019-03-07 RX ADMIN — Medication 650 MILLIGRAM(S): at 13:49

## 2019-03-07 RX ADMIN — Medication 10 MILLIGRAM(S): at 13:49

## 2019-03-07 RX ADMIN — ONDANSETRON 4 MILLIGRAM(S): 8 TABLET, FILM COATED ORAL at 21:20

## 2019-03-07 NOTE — ED PROVIDER NOTE - CARE PLAN
Principal Discharge DX:	Chronic nonintractable headache, unspecified headache type  Secondary Diagnosis:	Vision blurred

## 2019-03-07 NOTE — ED CDU PROVIDER INITIAL DAY NOTE - PROGRESS NOTE DETAILS
CDU NOTE DAVID DEL TORO: Pt resting comfortably, feeling well without complaint. denies dizziness or blurry vision now. NAD, VSS. neuro exam normal. optho saw pt; no intervention warranted, may f/u outpt for reading glasses. will continue monitoring. CDU NOTE DAVID DEL TORO: Pt c/o nausea and vomiting. will give zofran and continue monitoring. CDU NOTE DAVID Howell: pt resting comfortably, feels well without complaint. NAD recent VSS. no events on tele.

## 2019-03-07 NOTE — CONSULT NOTE ADULT - ASSESSMENT
38y Female PMH of HLD on statin, presenting from home w/ dizziness, blurry vision L eye, and headache.     Recs: 38y Female PMH of HLD on statin, presenting from home w/ dizziness, blurry vision L eye, and headache. CT head/CTA H/N 3/2 reviewed. Vision loss in upper fields(blacking out) improving to binocular blurred vision in left field of vision; etiology uncertain but raises concerns for pathologies impacting R visual centers(LGN, optic radiations, occipital), would consider ophthalmology evaluation for further workup given atypical pattern/progression of vision loss. Blurred vision, HA, tinnitus raises concerns for increased ICP; further evaluation with neuroimaging warranted for further workup.     Recs:   Agree with MRI/MRV head  Pain control  Ophthalmology consult 38y Female PMH of HLD on statin, presenting from home w/ dizziness, blurry vision L eye, and headache. CT head/CTA H/N 3/2 reviewed. Vision loss in upper fields(blacking out) improving to binocular blurred vision in left field of vision; etiology uncertain but raises concerns for pathologies impacting R visual centers(LGN, optic radiations, occipital), would consider ophthalmology evaluation for further workup given atypical pattern/progression of vision loss. Blurred vision, HA, tinnitus raises concerns for increased ICP; further evaluation with neuroimaging warranted for further workup.     Recs:   Agree with MRI/MRV head  Pain control  Ophthalmology consult  Orthostatic vitals

## 2019-03-07 NOTE — ED PROVIDER NOTE - OBJECTIVE STATEMENT
39 yo F, accompanied by , w/ PMH of HLD on statin, presenting from home w/ chief complaint of dizziness, blurry vision from the L eye, and headache. The patient was seen in the ED on 03/02/19 for same complaint. At that time, she was seen d/t feeling dizzy, described as a sensation of uneasiness, and not as room spinning, which was associated w/ tunnel vision. These symptoms were also accompanied by intermittent headaches. She had a CT head, CTA head, and CTA neck performed, which were all normal. She was also seen by the neurology team, who performed an unremarkable neurologic exam, and felt that patient did not require inpatient neurological workup, and could be followed as an outpatient. Patient made a neurology appointment for mid April, but has continued having symptoms, prompting a return visit to the ED. She continues to endorse L eye blurry vision, a feeling of dizziness/shakiness upon waking up this morning, and headache. Denies other complaints. Denies neck stiffness, fever/chills, chest pain, shortness of breath, difficulty breathing, palpitations, weakness, numbness, tingling, abdominal pain, nausea, vomiting, diarrhea, dysuria, hematuria, syncope.     PMD: Dr. Wesley De Dios  Pharmacy: [place], [place]

## 2019-03-07 NOTE — ED PROVIDER NOTE - NEUROLOGICAL, MLM
Alert and oriented, no focal deficits, no motor or sensory deficits. 5/5 strength all extremities. Cranial nerves intact. Intact finger to nose and heel to shin. Normal gait. Negative pronator drift, negative Romberg.

## 2019-03-07 NOTE — ED CDU PROVIDER INITIAL DAY NOTE - ATTENDING CONTRIBUTION TO CARE
I have personally performed a face to face diagnostic evaluation on this patient.  I have reviewed the ACP note and agree with the history, exam, and plan of care, except as noted.   My medical decision making and observations are found above.

## 2019-03-07 NOTE — ED ADULT NURSE NOTE - NSIMPLEMENTINTERV_GEN_ALL_ED
Implemented All Universal Safety Interventions:  [place] to call system. Call bell, personal items and telephone within reach. Instruct patient to call for assistance. Room bathroom lighting operational. Non-slip footwear when patient is off stretcher. Physically safe environment: no spills, clutter or unnecessary equipment. Stretcher in lowest position, wheels locked, appropriate side rails in place.

## 2019-03-07 NOTE — ED PROVIDER NOTE - CLINICAL SUMMARY MEDICAL DECISION MAKING FREE TEXT BOX
aric - repeat vixsit after neg ctcta with persistant blurry vsion , dizziness and ? floaters - pocus ocular  r/o retinal/vitreal issues with persit neuro s//s - neuro consult and mri mrv -- ms unlikely however w ocular complaints would consider -- nml fields normal neuro exam -- pt on ocp consider mrv - r/o sius thrombossis

## 2019-03-07 NOTE — ED ADULT NURSE NOTE - OBJECTIVE STATEMENT
38 y.o F arrived to ED c/o intermittent HA/ vision changes beginning 3/2/19. A&Ox3, ambulatory. pt states beginning 3/2 she has had intermittent epiosdes of feeling dizziness as well as having tunnel vision - described as "if I am looking at my  I can only see the bottom of him not the top" that would then resolve and recur. associated with mild HA and nausea, no vomiting. pmh HLD on statin. has recent workup including CT all negative. symptoms have persisted pt returned to ED, attempt to followup outpatient neurology no appt until April. respirations nonlabored pt in no acute distress abdomen soft neuro intact, face symmetrical, strength/sensation intact B/L upper lower extremities, PERRL. Denies CP, SOB, diarrhea, fevers, chills. Safety and comfort provided/maintained. Spouse at bedside

## 2019-03-07 NOTE — ED CDU PROVIDER INITIAL DAY NOTE - MEDICAL DECISION MAKING DETAILS
aric visual changes and ha is setting of nml ctcta-- freq neuro check , neuro consultation mrimrv r/o sinus thrombosisi

## 2019-03-07 NOTE — CONSULT NOTE ADULT - SUBJECTIVE AND OBJECTIVE BOX
[facility] Ophthalmology Consult Note    HPI:      HPI:  38y Female PMH of HLD on statin, presenting from home w/ dizziness, blurry vision L eye, and headache. Symptoms started on 3/2 after patient had a meal in the afternoon; started feeling dizzy, reports an abnormal sense of motion with her vision. Patient also with a hx of headache, 5/10 in intensity, bifrontal, pressure like sensation, occurring twice a week but more frequent for the past 3 weeks. Also reports nausea, tinnitus, no vomiting/photophobia/phonophobia. As per patient, describes blacking out of upper visual fields in both eyes at that time; symptoms have been improving since then; now complaining of binocular blurred vision in L field of vision. On birth control patch.     · Assessment		  38y Female PMH of HLD on statin, presenting from home w/ dizziness, blurry vision L eye, and headache. CT head/CTA H/N 3/2 reviewed. Vision loss in upper fields(blacking out) improving to binocular blurred vision in left field of vision; etiology uncertain but raises concerns for pathologies impacting R visual centers(LGN, optic radiations, occipital), would consider ophthalmology evaluation for further workup given atypical pattern/progression of vision loss. Blurred vision, HA, tinnitus raises concerns for increased ICP; further evaluation with neuroimaging warranted for further workup.     Recs:   Agree with MRI/MRV head  Pain control  Ophthalmology consult        PMH: None  Meds: None  POcHx (including surgeries/lasers/trauma):  None  Drops: None  FamHx: None  Social Hx: None  Allergies: NKDA    ROS:  General (neg), Vision (per HPI), Head and Neck (neg), Pulm (neg), CV (neg), GI (neg),  (neg), Musculoskeletal (neg), Skin/Integ (neg), Neuro (neg), Endocrine (neg), Heme (neg), All/Immuno (neg)    Mood and Affect Appropriate ( x ),  Oriented to Time, Place, and Person x 3 ( x )    Ophthalmology Exam    Visual acuity (sc): 20/20 OU  Pupils: PERRL OU, no APD  Ttono: 16 OU  Extraocular movements (EOMs): Full OU, no pain, no diplopia  Confrontational Visual Field (CVF):  Full OU  Color Plates: 12/12 OU    Slit Lamp Exam (SLE)  External:  Flat OU  Lids/Lashes/Lacrimal Ducts: Flat OU    Sclera/Conjunctiva:  W+Q OU  Cornea: Cl OU  Anterior Chamber: D+Q OU   Iris:  Flat OU  Lens:  Cl OU    Fundus Exam: dilated with 1% tropicamide and 2.5% phenylephrine  Approval obtained from primary team for dilation  Patient aware that pupils can remained dilated for at least 4-6 hours  Exam performed with 20D lens    Vitreous: wnl OU  Disc, cup/disc: sharp and pink, 0.4 OU  Macula:  wnl OU  Vessels:  wnl OU  Periphery: wnl OU    Diagnostic Testing:    Assessment:      Plan:      Follow-Up:  Patient should follow up his/her ophthalmologist or in the [facility] Practice within 1 week of discharge  [address].  [place], [place] [id number] [phone] [facility] Ophthalmology Consult Note    HPI: The pt is a 39 y/o F with a PMH of HLD presenting with CC of dizziness. The pt states that one week ago, she experienced dizziness and a sensation of the room moving that was followed by four hours of vision blurriness in the lower half of the visual field of both eyes. The pt states that this resolved on its own, but did prompt a visit to the ED, and the work up of CT Head/CTA were negative for acute pathology or vessel occlusion. The pt states that she has had episodic blurry vision that has been in patterns other than the lower half of the visual field. These have typically resolved spontaneously after a few minutes. The pt reports associated headache across the front of the head, throbbing and occurring for the last 2-3 weeks. Of note , the pt has started an OCP patch three weeks ago. Pt does report a whooshing sound in her hearing, denies tinnitus, nausea, vomiting. Ophthalmology are consulted for concern of elevated ICP and check of papilledema. The pt denies diplopia, red eye, eye discharge. Pt was recommended for glasses by her ophthalmologist, but did not get them.     PMH: None  Meds: OCP patch   POcHx (including surgeries/lasers/trauma):  None  Drops: None  FamHx: None  Social Hx: None  Allergies: NKDA    ROS:  General (neg), Vision (per HPI), Head and Neck (neg), Pulm (neg), CV (neg), GI (neg),  (neg), Musculoskeletal (neg), Skin/Integ (neg), Neuro (neg), Endocrine (neg), Heme (neg), All/Immuno (neg)    Mood and Affect Appropriate ( x ),  Oriented to Time, Place, and Person x 3 ( x )    Ophthalmology Exam    Visual acuity (sc): 20/60 OU, PH 20/20 OU  Pupils: PERRL OU, no APD  Ttono: 18 OD, 16 OS  Extraocular movements (EOMs): Full OU, no pain, no diplopia  Confrontational Visual Field (CVF):  Full OU  Color Plates: 12/12 OU    Slit Lamp Exam (SLE)  External:  Flat OU  Lids/Lashes/Lacrimal Ducts: Flat OU    Sclera/Conjunctiva:  W+Q OU  Cornea: Cl OU  Anterior Chamber: D+Q OU   Iris:  Flat OU  Lens:  Cl OU    Fundus Exam: dilated with 1% tropicamide and 2.5% phenylephrine  Approval obtained from primary team for dilation  Patient aware that pupils can remained dilated for at least 4-6 hours  Exam performed with 20D lens    Vitreous: wnl OU  Disc, cup/disc: sharp and pink, 0.4 OU  Macula:  wnl OU  Vessels:  wnl OU  Periphery: wnl OU      Assessment and Plan:    Encounter for Eye Exam  -       Follow-Up:  Patient should follow up his/her ophthalmologist or in the [facility] Ophthalmology Practice within 1 week of discharge  [address] [place] [address].  [place] [address] [phone] [facility] Ophthalmology Consult Note    HPI: The pt is a 39 y/o F with a PMH of HLD presenting with CC of dizziness. The pt states that one week ago, she experienced dizziness and a sensation of the room moving that was followed by four hours of vision blurriness in the lower half of the visual field of both eyes. The pt states that this resolved on its own, but did prompt a visit to the ED, and the work up of CT Head/CTA were negative for acute pathology or vessel occlusion. The pt states that she has had episodic blurry vision that has been in patterns other than the lower half of the visual field. These have typically resolved spontaneously after a few minutes. The pt reports associated headache across the front of the head, throbbing and occurring for the last 2-3 weeks. Of note , the pt has started an OCP patch three weeks ago. Pt does report a whooshing sound in her hearing, denies tinnitus, nausea, vomiting. Ophthalmology are consulted for concern of elevated ICP and check of papilledema. The pt denies diplopia, red eye, eye discharge. Pt was recommended for glasses by her ophthalmologist, but did not get them.     PMH: None  Meds: OCP patch   POcHx (including surgeries/lasers/trauma):  None  Drops: None  FamHx: None  Social Hx: None  Allergies: NKDA    ROS:  General (neg), Vision (per HPI), Head and Neck (neg), Pulm (neg), CV (neg), GI (neg),  (neg), Musculoskeletal (neg), Skin/Integ (neg), Neuro (neg), Endocrine (neg), Heme (neg), All/Immuno (neg)    Mood and Affect Appropriate ( x ),  Oriented to Time, Place, and Person x 3 ( x )    Ophthalmology Exam    Visual acuity (sc): 20/60 OU, PH 20/20 OU  Pupils: PERRL OU, no APD  Ttono: 18 OD, 16 OS  Extraocular movements (EOMs): Full OU, no pain, no diplopia  Confrontational Visual Field (CVF):  Full OU  Color Plates: 12/12 OU    Slit Lamp Exam (SLE)  External:  Flat OU  Lids/Lashes/Lacrimal Ducts: Flat OU    Sclera/Conjunctiva:  W+Q OU  Cornea: Cl OU  Anterior Chamber: D+Q OU   Iris:  Flat OU  Lens:  Cl OU    Fundus Exam: dilated with 1% tropicamide and 2.5% phenylephrine  Approval obtained from primary team for dilation   Patient aware that pupils can remained dilated for at least 4-6 hours  Exam performed with 20D lens    Vitreous: wnl OU  Disc, cup/disc: sharp and pink, 0.4 OU  Macula:  wnl OU  Vessels:  wnl OU  Periphery: wnl OU      Assessment and Plan:    Encounter for Eye Exam  -       Follow-Up:  Patient should follow up his/her ophthalmologist or in the [facility] Ophthalmology Practice within 1 week of discharge  [address] [place] [address].  [place] [address] [phone] [facility] Ophthalmology Consult Note    HPI: The pt is a 37 y/o F with a PMH of HLD presenting with CC of dizziness. The pt states that one week ago, she experienced dizziness and a sensation of the room moving that was followed by four hours of vision blurriness in the lower half of the visual field of both eyes. The pt states that this resolved on its own, but did prompt a visit to the ED, and the work up of PT, PTT, INR and imaging CT Head/CTA were negative for acute pathology or vessel occlusion. The pt states that she has had episodic blurry vision that has been in patterns other than the lower half of the visual field, presenting in the right, center and left of the visual field, but this has not occurred in the last week. These have typically resolved spontaneously after a few minutes. The pt reports associated headache across the front of the head, throbbing and occurring for the last 2-3 weeks. Of note , the pt has started an OCP patch three weeks ago. Pt does report a whooshing sound in her hearing, denies tinnitus, nausea, vomiting. Ophthalmology are consulted for concern of elevated ICP and check of papilledema. The pt denies diplopia, red eye, eye discharge. Pt was recommended for glasses by her ophthalmologist, but did not get them.   PMH: None  Meds: OCP patch   POcHx (including surgeries/lasers/trauma):  None  Drops: None  FamHx: None  Social Hx: None  Allergies: NKDA    ROS:  General (neg), Vision (per HPI), Head and Neck (neg), Pulm (neg), CV (neg), GI (neg),  (neg), Musculoskeletal (neg), Skin/Integ (neg), Neuro (neg), Endocrine (neg), Heme (neg), All/Immuno (neg)    Mood and Affect Appropriate ( x ),  Oriented to Time, Place, and Person x 3 ( x )    Ophthalmology Exam    Visual acuity (sc): 20/60 OU, PH 20/20 OU  Pupils: PERRL OU, no APD  Ttono: 18 OD, 16 OS  Extraocular movements (EOMs): Full OU, no pain, no diplopia  Confrontational Visual Field (CVF):  Full OU  Color Plates: 12/12 OU    Slit Lamp Exam (SLE)  External:  Flat OU  Lids/Lashes/Lacrimal Ducts: Flat OU    Sclera/Conjunctiva:  W+Q OU  Cornea: Cl OU  Anterior Chamber: D+Q OU   Iris:  Flat OU  Lens:  Cl OU    Fundus Exam: dilated with 1% tropicamide and 2.5% phenylephrine  Approval obtained from primary team for dilation   Patient aware that pupils can remained dilated for at least 4-6 hours  Exam performed with 20D lens    Vitreous: wnl OU  Disc, cup/disc: sharp and pink, 0.4 OU  Macula:  wnl OU  Vessels:  wnl OU  Periphery: wnl OU      Assessment and Plan:    Encounter for Eye Exam  -       Follow-Up:  Patient should follow up his/her ophthalmologist or in the [facility] Ophthalmology Practice within 1 week of discharge  [address] [facility].  [place], [place] [id number] [phone] [facility] Ophthalmology Consult Note    HPI: The pt is a 39 y/o F with a PMH of HLD presenting with CC of dizziness. The pt states that one week ago, she experienced dizziness and a sensation of the room moving that was followed by four hours of vision blurriness in the lower half of the visual field of both eyes. The pt states that this resolved on its own, but did prompt a visit to the ED, and the work up of PT, PTT, INR and imaging CT Head/CTA were negative for acute pathology or vessel occlusion. The pt states that she has had episodic blurry vision that has been in patterns other than the lower half of the visual field, presenting in the right, center and left of the visual field, but this has not occurred in the last week. These have typically resolved spontaneously after a few minutes. The pt reports associated headache across the front of the head, throbbing and occurring for the last 2-3 weeks. Of note , the pt has also started an OCP patch three weeks ago. Pt does report a whooshing sound in her hearing, and eyelid twitch of the left eye yesterday evening which has since resolved, denies tinnitus, nausea, vomiting. Ophthalmology are consulted for concern of elevated ICP and check of papilledema. The pt denies diplopia, red eye, eye discharge. Pt was recommended for glasses by her ophthalmologist, but did not get them.   PMH: None  Meds: OCP patch   POcHx (including surgeries/lasers/trauma):  None  Drops: None  FamHx: None  Social Hx: None  Allergies: NKDA    ROS:  General (neg), Vision (per HPI), Head and Neck (neg), Pulm (neg), CV (neg), GI (neg),  (neg), Musculoskeletal (neg), Skin/Integ (neg), Neuro (neg), Endocrine (neg), Heme (neg), All/Immuno (neg)    Mood and Affect Appropriate ( x ),  Oriented to Time, Place, and Person x 3 ( x )    Ophthalmology Exam    Visual acuity (sc): 20/60 OU, PH 20/20 OU  Pupils: PERRL OU, no APD  Ttono: 18 OD, 16 OS  Extraocular movements (EOMs): Full OU, no pain, no diplopia  Confrontational Visual Field (CVF):  Full OU  Color Plates: 12/12 OU    Slit Lamp Exam (SLE)  External:  Flat OU  Lids/Lashes/Lacrimal Ducts: Flat OU    Sclera/Conjunctiva:  W+Q OU  Cornea: Cl OU  Anterior Chamber: D+Q OU   Iris:  Flat OU  Lens:  Cl OU    Fundus Exam: dilated with 1% tropicamide and 2.5% phenylephrine  Approval obtained from primary team for dilation   Patient aware that pupils can remained dilated for at least 4-6 hours  Exam performed with 20D lens    Vitreous: wnl OU  Disc, cup/disc: sharp and pink, 0.4 OU  Macula:  wnl OU  Vessels:  wnl OU  Periphery: wnl OU      Assessment and Plan:    Encounter for Eye Exam  - no evidence of optic disc edema on DFE  - unclear as to the etiology of the episodic blurry vision that pt has experienced, it may be refractive error/astigmatism vs headache associated manifestation of ocular migraine. The pt does seem to be more symptomatic following the administration of the OCP patch  - Amsler grid testing wnl OU,   - Pt would benefit from refraction for glasses as she demonstrates marked improvement of VA with pinhole, non-urgent OCT macula on outpatient as well  - will follow patient      Follow-Up:  Patient should follow up his/her ophthalmologist or in the [facility] Ophthalmology Practice within 1 week of discharge  [address] [facility] [place] [address]  [phone]

## 2019-03-07 NOTE — CONSULT NOTE ADULT - ATTENDING COMMENTS
VASCULAR NEUROLOGY ATTENDING  The patient is seen and examined the history and imaging are reviewed. I agree with the resident note unless otherwise noted. Patient with embolic appearing infarcts. Etiology unclear recently started OCP patch. Exam normal. Advised to stop OCPs. Will need TTE/TRACI hypercoagulable work-up LE dopplers. ASA Plavix for 3 weeks then ASA alone. Heme eval Cardiology eval. Admission offered to patient and she refused. She endorses and understand the urgency and need for outpatient follow-up.

## 2019-03-07 NOTE — CONSULT NOTE ADULT - SUBJECTIVE AND OBJECTIVE BOX
Neurology Consult    HPI:  38y Female PMH of HLD on statin, presenting from home w/ dizziness, blurry vision L eye, and headache.     REVIEW OF SYSTEMS:  As abvoe    MEDICATIONS    PMH: Prediabetes  Hyperlipidemia  Diabetes  No pertinent past medical history  No pertinent past medical history  No pertinent past medical history  No pertinent past medical history  No pertinent past medical history      PSH: No significant past surgical history    FAMILY HISTORY:  No pertinent family history in first degree relatives    No history of dementia, strokes, or seizures     SOCIAL HISTORY:  No history of tobacco or alcohol use     Allergies  No Known Allergies    Intolerances    Vital Signs Last 24 Hrs  T(C): 36.8 (07 Mar 2019 12:26), Max: 36.8 (07 Mar 2019 12:26)  T(F): 98.3 (07 Mar 2019 12:26), Max: 98.3 (07 Mar 2019 12:26)  HR: 85 (07 Mar 2019 12:26) (85 - 85)  BP: 131/82 (07 Mar 2019 12:26) (131/82 - 131/82)  BP(mean): --  RR: 18 (07 Mar 2019 12:26) (18 - 18)  SpO2: 100% (07 Mar 2019 12:26) (100% - 100%)    Neurological Examination:    Mental Status: Patient is alert, awake, oriented X3. Patient is fluent, no dysarthria, no aphasia. Follows commands well and able to answer questions appropriately. Mood and affect normal.    Cranial Nerves: PERRL, EOMI, visual field intact, V1-V3 intact, no gross facial asymmetry, tongue/uvula midline    Motor Exam: No drift  Right upper extremity: 5/5  Left upper extremity: 5/5  Right lower extremity: 5/5  Left lower extremity: 5/5    Normal bulk/tone    Sensory: Intact to light touch and pinprick bilaterally. No extinction    Coordination: Finger to nose intact bilaterally     Gait: Normal      Reflexes: Bilateral 2+ Biceps, Brachial, Patellar, Ankle  Plantar: Down bilateral    GENERAL: No acute distress  HEENT:  Normocephalic, atraumatic  EXTREMITIES: No edema, clubbing, cyanosis  MUSCULOSKELETAL: Normal range of motion  SKIN: No rashes    LABS:  CBC Full  -  ( 07 Mar 2019 14:08 )  WBC Count : 8.0 K/uL  Hemoglobin : 13.1 g/dL  Hematocrit : 37.9 %  Platelet Count - Automated : 270 K/uL  Mean Cell Volume : 78.4 fl  Mean Cell Hemoglobin : 27.1 pg  Mean Cell Hemoglobin Concentration : 34.6 gm/dL  Auto Neutrophil # : 4.5 K/uL  Auto Lymphocyte # : 2.9 K/uL  Auto Monocyte # : 0.4 K/uL  Auto Eosinophil # : 0.1 K/uL  Auto Basophil # : 0.0 K/uL  Auto Neutrophil % : 56.7 %  Auto Lymphocyte % : 35.8 %  Auto Monocyte % : 5.3 %  Auto Eosinophil % : 1.7 %  Auto Basophil % : 0.5 %      03-07    140  |  103  |  9   ----------------------------<  126<H>  4.4   |  24  |  0.49<L>    Ca    9.7      07 Mar 2019 14:08    TPro  7.4  /  Alb  4.0  /  TBili  0.1<L>  /  DBili  x   /  AST  14  /  ALT  14  /  AlkPhos  53  03-07    LIVER FUNCTIONS - ( 07 Mar 2019 14:08 )  Alb: 4.0 g/dL / Pro: 7.4 g/dL / ALK PHOS: 53 U/L / ALT: 14 U/L / AST: 14 U/L / GGT: x           Hemoglobin A1C:     RADIOLOGY:  CT head:  MRI: Neurology Consult    HPI:  38y Female PMH of HLD on statin, presenting from home w/ dizziness, blurry vision L eye, and headache. Symptoms started on 3/2 after patient had a meal in the afternoon; started feeling dizzy, reports an abnormal sense of motion with her vision. Patient also with a hx of headache, 5/10 in intensity, bifrontal, pressure like sensation, occurring twice a week but more frequent for the past 3 weeks. Also reports nausea, tinnitus, no vomiting/photophobia/phonophobia. As per patient, describes blacking out of upper visual fields in both eyes at that time; symptoms have been improving since then; now complaining of binocular blurred vision in L field of vision. On birth control patch.     REVIEW OF SYSTEMS:  As above    MEDICATIONS    PMH:   Hyperlipidemia    PSH:   No significant past surgical history    FAMILY HISTORY:  No pertinent family history in first degree relatives    No history of dementia, strokes, or seizures     SOCIAL HISTORY:  No history of tobacco or alcohol use     Allergies  No Known Allergies    Intolerances    Vital Signs Last 24 Hrs  T(C): 36.8 (07 Mar 2019 12:26), Max: 36.8 (07 Mar 2019 12:26)  T(F): 98.3 (07 Mar 2019 12:26), Max: 98.3 (07 Mar 2019 12:26)  HR: 85 (07 Mar 2019 12:26) (85 - 85)  BP: 131/82 (07 Mar 2019 12:26) (131/82 - 131/82)  BP(mean): --  RR: 18 (07 Mar 2019 12:26) (18 - 18)  SpO2: 100% (07 Mar 2019 12:26) (100% - 100%)    Neurological Examination:    Mental Status: Patient is alert, awake, oriented X3. Patient is fluent, no dysarthria, no aphasia. Follows commands well and able to answer questions appropriately. Mood and affect normal.    Cranial Nerves: PERRL, EOMI, visual field intact, V1-V3 intact, no gross facial asymmetry, tongue/uvula midline    Motor Exam: No drift  Right upper extremity: 5/5  Left upper extremity: 5/5  Right lower extremity: 5/5  Left lower extremity: 5/5    Normal bulk/tone    Sensory: Intact to light touch bilaterally. No extinction    Coordination: Finger to nose intact bilaterally     Reflexes: Bilateral 1+ Biceps, Brachial, Patellar, +0 Ankle  Plantar: Down bilateral    GENERAL: No acute distress  HEENT:  Normocephalic, atraumatic  EXTREMITIES: No edema, clubbing, cyanosis  MUSCULOSKELETAL: Normal range of motion  SKIN: No rashes    LABS:  CBC Full  -  ( 07 Mar 2019 14:08 )  WBC Count : 8.0 K/uL  Hemoglobin : 13.1 g/dL  Hematocrit : 37.9 %  Platelet Count - Automated : 270 K/uL  Mean Cell Volume : 78.4 fl  Mean Cell Hemoglobin : 27.1 pg  Mean Cell Hemoglobin Concentration : 34.6 gm/dL  Auto Neutrophil # : 4.5 K/uL  Auto Lymphocyte # : 2.9 K/uL  Auto Monocyte # : 0.4 K/uL  Auto Eosinophil # : 0.1 K/uL  Auto Basophil # : 0.0 K/uL  Auto Neutrophil % : 56.7 %  Auto Lymphocyte % : 35.8 %  Auto Monocyte % : 5.3 %  Auto Eosinophil % : 1.7 %  Auto Basophil % : 0.5 %      03-07    140  |  103  |  9   ----------------------------<  126<H>  4.4   |  24  |  0.49<L>    Ca    9.7      07 Mar 2019 14:08    TPro  7.4  /  Alb  4.0  /  TBili  0.1<L>  /  DBili  x   /  AST  14  /  ALT  14  /  AlkPhos  53  03-07    LIVER FUNCTIONS - ( 07 Mar 2019 14:08 )  Alb: 4.0 g/dL / Pro: 7.4 g/dL / ALK PHOS: 53 U/L / ALT: 14 U/L / AST: 14 U/L / GGT: x           Hemoglobin A1C:     RADIOLOGY:  CT head:  MRI:

## 2019-03-07 NOTE — ED CDU PROVIDER INITIAL DAY NOTE - OBJECTIVE STATEMENT
37 yo F, accompanied by , w/ PMH of HLD on statin, presenting from home w/ chief complaint of dizziness, blurry vision from the L eye, and headache. The patient was seen in the ED on 03/02/19 for same complaint. pt's symptoms are intermittent, and currently she is not experiencing them. She had a CT head, CTA head, and CTA neck performed, which were all normal at her last visit to ED on 3/2. She was also seen by the neurology team, who performed an unremarkable neurologic exam, and felt that patient did not require inpatient neurological workup, and could be followed as an outpatient. Patient made a neurology appointment for mid April, but has continued having symptoms, prompting a return visit to the ED. She continues to endorse L eye blurry vision, a feeling of dizziness/shakiness upon waking up this morning, and headache. Denies other complaints. Denies neck stiffness, fever/chills, chest pain, shortness of breath, difficulty breathing, palpitations, weakness, numbness, tingling, abdominal pain, nausea, vomiting, diarrhea, dysuria, hematuria, syncope.     PMD: Dr. Wesley De Dios  Pharmacy: [place], [place]

## 2019-03-07 NOTE — ED ADULT TRIAGE NOTE - CHIEF COMPLAINT QUOTE
dizziness on and off since last week with vision change on and off since last week; was here last week and was told to have an outpatient MRI

## 2019-03-08 VITALS
TEMPERATURE: 98 F | HEART RATE: 103 BPM | SYSTOLIC BLOOD PRESSURE: 132 MMHG | RESPIRATION RATE: 16 BRPM | DIASTOLIC BLOOD PRESSURE: 82 MMHG | OXYGEN SATURATION: 99 %

## 2019-03-08 DIAGNOSIS — I63.9 CEREBRAL INFARCTION, UNSPECIFIED: ICD-10-CM

## 2019-03-08 DIAGNOSIS — R00.2 PALPITATIONS: ICD-10-CM

## 2019-03-08 LAB — HCYS SERPL-MCNC: 4.2 UMOL/L — SIGNIFICANT CHANGE UP

## 2019-03-08 PROCEDURE — 93970 EXTREMITY STUDY: CPT | Mod: 26

## 2019-03-08 PROCEDURE — 81291 MTHFR GENE: CPT

## 2019-03-08 PROCEDURE — 93306 TTE W/DOPPLER COMPLETE: CPT

## 2019-03-08 PROCEDURE — 85303 CLOT INHIBIT PROT C ACTIVITY: CPT

## 2019-03-08 PROCEDURE — 96375 TX/PRO/DX INJ NEW DRUG ADDON: CPT

## 2019-03-08 PROCEDURE — 83090 ASSAY OF HOMOCYSTEINE: CPT

## 2019-03-08 PROCEDURE — G0378: CPT

## 2019-03-08 PROCEDURE — 81241 F5 GENE: CPT

## 2019-03-08 PROCEDURE — 93970 EXTREMITY STUDY: CPT

## 2019-03-08 PROCEDURE — 85613 RUSSELL VIPER VENOM DILUTED: CPT

## 2019-03-08 PROCEDURE — 85027 COMPLETE CBC AUTOMATED: CPT

## 2019-03-08 PROCEDURE — 84443 ASSAY THYROID STIM HORMONE: CPT

## 2019-03-08 PROCEDURE — 96376 TX/PRO/DX INJ SAME DRUG ADON: CPT | Mod: XU

## 2019-03-08 PROCEDURE — 70551 MRI BRAIN STEM W/O DYE: CPT

## 2019-03-08 PROCEDURE — G0452: CPT | Mod: 26

## 2019-03-08 PROCEDURE — 70553 MRI BRAIN STEM W/O & W/DYE: CPT | Mod: 26

## 2019-03-08 PROCEDURE — 86146 BETA-2 GLYCOPROTEIN ANTIBODY: CPT

## 2019-03-08 PROCEDURE — 84436 ASSAY OF TOTAL THYROXINE: CPT

## 2019-03-08 PROCEDURE — 70546 MR ANGIOGRAPH HEAD W/O&W/DYE: CPT

## 2019-03-08 PROCEDURE — 85300 ANTITHROMBIN III ACTIVITY: CPT

## 2019-03-08 PROCEDURE — 84480 ASSAY TRIIODOTHYRONINE (T3): CPT

## 2019-03-08 PROCEDURE — 76512 OPH US DX B-SCAN: CPT

## 2019-03-08 PROCEDURE — 99284 EMERGENCY DEPT VISIT MOD MDM: CPT | Mod: 25

## 2019-03-08 PROCEDURE — 86147 CARDIOLIPIN ANTIBODY EA IG: CPT

## 2019-03-08 PROCEDURE — 93306 TTE W/DOPPLER COMPLETE: CPT | Mod: 26

## 2019-03-08 PROCEDURE — 80053 COMPREHEN METABOLIC PANEL: CPT

## 2019-03-08 PROCEDURE — 84702 CHORIONIC GONADOTROPIN TEST: CPT

## 2019-03-08 PROCEDURE — 96374 THER/PROPH/DIAG INJ IV PUSH: CPT | Mod: XU

## 2019-03-08 PROCEDURE — 99217: CPT

## 2019-03-08 RX ORDER — ONDANSETRON 8 MG/1
4 TABLET, FILM COATED ORAL ONCE
Qty: 0 | Refills: 0 | Status: COMPLETED | OUTPATIENT
Start: 2019-03-08 | End: 2019-03-08

## 2019-03-08 RX ORDER — ATORVASTATIN CALCIUM 80 MG/1
1 TABLET, FILM COATED ORAL
Qty: 30 | Refills: 0
Start: 2019-03-08 | End: 2019-04-06

## 2019-03-08 RX ORDER — KETOROLAC TROMETHAMINE 30 MG/ML
15 SYRINGE (ML) INJECTION ONCE
Qty: 0 | Refills: 0 | Status: DISCONTINUED | OUTPATIENT
Start: 2019-03-08 | End: 2019-03-08

## 2019-03-08 RX ORDER — SODIUM CHLORIDE 9 MG/ML
1000 INJECTION INTRAMUSCULAR; INTRAVENOUS; SUBCUTANEOUS ONCE
Qty: 0 | Refills: 0 | Status: COMPLETED | OUTPATIENT
Start: 2019-03-08 | End: 2019-03-08

## 2019-03-08 RX ORDER — CLOPIDOGREL BISULFATE 75 MG/1
1 TABLET, FILM COATED ORAL
Qty: 20 | Refills: 0
Start: 2019-03-08 | End: 2019-03-27

## 2019-03-08 RX ORDER — ACETAMINOPHEN 500 MG
650 TABLET ORAL ONCE
Qty: 0 | Refills: 0 | Status: COMPLETED | OUTPATIENT
Start: 2019-03-08 | End: 2019-03-08

## 2019-03-08 RX ORDER — ASPIRIN/CALCIUM CARB/MAGNESIUM 324 MG
81 TABLET ORAL DAILY
Qty: 0 | Refills: 0 | Status: DISCONTINUED | OUTPATIENT
Start: 2019-03-08 | End: 2019-03-11

## 2019-03-08 RX ORDER — ASPIRIN/CALCIUM CARB/MAGNESIUM 324 MG
1 TABLET ORAL
Qty: 30 | Refills: 0
Start: 2019-03-08 | End: 2019-04-06

## 2019-03-08 RX ORDER — FAMOTIDINE 10 MG/ML
20 INJECTION INTRAVENOUS ONCE
Qty: 0 | Refills: 0 | Status: COMPLETED | OUTPATIENT
Start: 2019-03-08 | End: 2019-03-08

## 2019-03-08 RX ORDER — CLOPIDOGREL BISULFATE 75 MG/1
75 TABLET, FILM COATED ORAL DAILY
Qty: 0 | Refills: 0 | Status: DISCONTINUED | OUTPATIENT
Start: 2019-03-08 | End: 2019-03-11

## 2019-03-08 RX ADMIN — Medication 650 MILLIGRAM(S): at 16:26

## 2019-03-08 RX ADMIN — SODIUM CHLORIDE 1000 MILLILITER(S): 9 INJECTION INTRAMUSCULAR; INTRAVENOUS; SUBCUTANEOUS at 15:31

## 2019-03-08 RX ADMIN — CLOPIDOGREL BISULFATE 75 MILLIGRAM(S): 75 TABLET, FILM COATED ORAL at 11:38

## 2019-03-08 RX ADMIN — Medication 81 MILLIGRAM(S): at 11:38

## 2019-03-08 RX ADMIN — Medication 30 MILLILITER(S): at 01:16

## 2019-03-08 RX ADMIN — ONDANSETRON 4 MILLIGRAM(S): 8 TABLET, FILM COATED ORAL at 01:16

## 2019-03-08 RX ADMIN — FAMOTIDINE 20 MILLIGRAM(S): 10 INJECTION INTRAVENOUS at 01:16

## 2019-03-08 NOTE — CONSULT NOTE ADULT - PROBLEM SELECTOR RECOMMENDATION 9
DC OCPs  ASA and Statin   Check TTE   Will plan for TRACI and extended cardiac monitoring to rule out occult Afib   Check Thyroid panel   Neurology follow up

## 2019-03-08 NOTE — ED CDU PROVIDER DISPOSITION NOTE - CARE PROVIDER_API CALL
Reuben Craft (DO)  Neurology; Vascular Neurology  [address] [facility] [address]  [place] [address]  Phone: [phone]  Fax: [phone]  Follow Up Time: 1-3 Days    Tip Du (DO)  Cardiology; Internal Medicine  [address] [facility], [address]  [place] [address]  Phone: [phone]  Fax: [phone]  Follow Up Time: 1-3 Days

## 2019-03-08 NOTE — ED ADULT NURSE REASSESSMENT NOTE - NS ED NURSE REASSESS COMMENT FT1
Report taken from Rahel LONDONO. states pt have a good night with no complaints. Will continue to monitor.
Pt received from BRY Sexton. Pt oriented to CDU & plan of care was discussed. Pt A&O x 4. Pt in CDU for MRI and neuro check.  Pt denies any dizziness, lightheadedness, headache, or blurred vision as of now. Pt c/o nausea, DAVID Mcgovern aware, medicated as per MAR. Pt neuro check intact, no deficits noted. Safety & comfort measures maintained. Call bell in reach. Will continue to monitor.

## 2019-03-08 NOTE — ED CDU PROVIDER DISPOSITION NOTE - CLINICAL COURSE
37y/o F with PMH HLD c/o H/A, blurry vision, and dizziness. denies diplopia, decreased vision, speech changes, CP, SOB, weakness, paresthesias or other symptoms.  In ED, labs unremarkable, L eye US by ED was negative, neuro c/s'd and recommended pain control and MRI/MRV head. also recommended ophtho c/s. pt sent to CDU for management and imaging.  In CDU, ophtho saw pt, no intervention warranted, pt to f/u outpt for glasses. MRI/MRV ____________ 37y/o F with PMH HLD c/o H/A, blurry vision, and dizziness. denies diplopia, decreased vision, speech changes, CP, SOB, weakness, paresthesias or other symptoms.  In ED, labs unremarkable, L eye US by ED was negative, neuro c/s'd and recommended pain control and MRI/MRV head. also recommended ophtho c/s. pt sent to CDU for management and imaging.  In CDU, ophtho saw pt, no intervention warranted, pt to f/u outpt for glasses. MRI/MRV showed scattered acute infarcts.  Pt seen by neurology who recommended hypercoagulable work-up, echo, lower extremity dopplers, and cardiology consult. Pt was seen by cardiology. Echo showed PFO. Dopplers were negative. Pt was discharged home on aspirin, plavix, lipitor with outpt cardiology and neurology follow-up

## 2019-03-08 NOTE — ED CDU PROVIDER DISPOSITION NOTE - NSFOLLOWUPINSTRUCTIONS_ED_ALL_ED_FT
1. Continue your home medications as directed, except discontinue contraceptive patch and do not take any oral contraceptives. Start Plavix 75mg daily for 3 weeks, Aspirin 81mg daily, and Lipitor 40mg daily.    2. Drink plenty of fluids to stay hydrated.  3. You will need to follow up with Dr. De Dios your medical doctor, Dr. Craft neurologist, and Dr. Du cardiologist. Follow up with your ophthalmologist or our ophtho clinic ([phone]) within 1 week.  A copy of your results were given to you to bring to your appt.   4. Return to ER for any worsening or concerning symptoms. See attached stroke education.

## 2019-03-08 NOTE — ED CDU PROVIDER SUBSEQUENT DAY NOTE - HISTORY
No interval changes since initial CDU provider note. Pt c/o some nausea and abd pain again, would like medication. NAD, VSS. Abd: flat/ND/soft, + ttp epigastric region. will give zofran, pepcid, and maalox. if pain continues will give toradol and continue monitoring.  - DAVID Pretty

## 2019-03-08 NOTE — ED CDU PROVIDER SUBSEQUENT DAY NOTE - PROGRESS NOTE DETAILS
CDU NOTE DAVID DEL TORO: Pt resting comfortably, feeling well without complaint. states nausea and abd pain resolved. denies H/A and blurry vision. NAD, VSS. will continue monitoring. Pt comfortable. States she is feeling better. Vital signs stable. Received call from MRI that patient has scattered strokes. Neurology team notified. They will see pt. -Leora Fraire PA-C Pt comfortable. States she is feeling better. Vital signs stable. Received call from MRI that patient has scattered strokes. Neurology team notified. They will come see pt. -Leora Fraire PA-C Pt seen by Dr. Craft neurology, recommends echo and LE dopplers, hypercoag blood work, and outpt follow-up.  Dr. Du cardiology saw pt per Dr. Craft's request and will follow up as an outpt. Discussed with Dr. Coy Fraire PA-C Pt comfortable. No complaints. Echo shows PFO. Awaiting lower extremity dopplers. will continue to monitor. discussed with Dr. Cespedes. -Leora Fraire PA-C Pt comfortable. No complaints. -110, normal saline bolus given. HR improved to 92-95. LE dopplers negative. pt stable for d/c home. discussed with Dr. Cespedes. -Leora Fraire PA-C

## 2019-03-08 NOTE — ED CDU PROVIDER DISPOSITION NOTE - ATTENDING CONTRIBUTION TO CARE
Attending MD Cespedes:   I personally have seen and examined this patient.  Physician assistant note reviewed and agree on plan of care and except where noted.  See below for details.     Seen in CDU4, unaccompanied    38F with PMH/PSH including HLD sent to the CDU for neuro evals, MRI/MRV after returning to the ED for dizziness, blurry vision from the L eye, and headache. Had previous CT head, CTA head, and CTA neck performed, which were nonactionable.  Patient was seen by neurology and ophthalmology.  Neuro recommended MRI/MRV and ophtho consult.  No acute intervention as per Ophtho.  MRI/MRV revealed "several small scattered acute infarcts involving the left frontal cortex, left centrum semiovale and right occipital lobe suggesting infarcts in multiple vascular territories. This may be due to emboli or hypercoagulable state. No evidence of venous thrombosis. A small right frontal parasagittal extra axial mass likely meningioma." Patient reports headache and dizziness resolved and reports L eye visual changes have been improved since 3/2/19.  On exam, NAD, CN 2-12 grossly intact, head NCAT, PERRL, FROM at neck, no tenderness to midline palpation, no stepoffs along length of spine, lungs CTAB with good inspiratory effort, +S1S2, no m/r/g, abdomen soft with +BS, NT, ND, no CVAT, moving all extremities with 5/5 strength bilateral upper and lower extremities, good and equal  strength bilaterally, sensory grossly intact, no calf tenderness, swelling, erythema or warmth; A/P: 38F with acute infarcts on MRI.  Patient re-evaluated and feeling improved.  No acute issues at  this time.  Neuro recs, Lab and radiology tests reviewed with patient and family including MR, LE duplex and TTE.  Patient stable for discharge. Follow up instructions given, importance of follow up emphasized, return to ED parameters reviewed and patient verbalized understanding.  All questions answered, all concerns addressed.

## 2019-03-08 NOTE — CONSULT NOTE ADULT - SUBJECTIVE AND OBJECTIVE BOX
CHIEF COMPLAINT:  CVA    HISTORY OF PRESENT ILLNESS:  38y Female PMH of HLD on statin, presenting from home w/ dizziness, blurry vision L eye, and headache. Symptoms started on 3/2 after patient had a meal in the afternoon; started feeling dizzy, reports an abnormal sense of motion with her vision. Patient also with a hx of headache, 5/10 in intensity, bifrontal, pressure like sensation, occurring twice a week but more frequent for the past 3 weeks. Also reports nausea, tinnitus, no vomiting/photophobia/phonophobia. As per patient, describes blacking out of upper visual fields in both eyes at that time; symptoms have been improving since then; now complaining of binocular blurred vision in L field of vision. On birth control patch.   Also complains of more frequent palpitations episodes since January time. This coincides with the time she started her OCPs.  Found to have multiple scattered brain infarcts.   No chest pain or shortness of breath.   Strong family history of CAD/MI.     PAST MEDICAL & SURGICAL HISTORY:  Prediabetes  Hyperlipidemia  No significant past surgical history          MEDICATIONS:  aspirin enteric coated 81 milliGRAM(s) Oral daily  clopidogrel Tablet 75 milliGRAM(s) Oral daily        ketorolac   Injectable 15 milliGRAM(s) IV Push once PRN  metoclopramide Injectable 10 milliGRAM(s) IV Push every 6 hours PRN  ondansetron Injectable 4 milliGRAM(s) IV Push every 4 hours PRN            FAMILY HISTORY:  No pertinent family history in first degree relatives      SOCIAL HISTORY:    [ ] Non-smoker  [ ] Smoker  [ ] Alcohol    Allergies    No Known Allergies    Intolerances    	    REVIEW OF SYSTEMS:  CONSTITUTIONAL: No fever, weight loss, + fatigue  EYES: No eye pain, +visual disturbances, or discharge  ENMT:  No difficulty hearing, tinnitus, vertigo; No sinus or throat pain  NECK: No pain or stiffness  RESPIRATORY: No cough, wheezing, chills or hemoptysis; No Shortness of Breath  CARDIOVASCULAR: No chest pain, palpitations, passing out, dizziness, or leg swelling  GASTROINTESTINAL: No abdominal or epigastric pain. No nausea, vomiting, or hematemesis; No diarrhea or constipation. No melena or hematochezia.  GENITOURINARY: No dysuria, frequency, hematuria, or incontinence  NEUROLOGICAL: No headaches, memory loss, +loss of strength, numbness, or tremors  SKIN: No itching, burning, rashes, or lesions   LYMPH Nodes: No enlarged glands  ENDOCRINE: No heat or cold intolerance; No hair loss  MUSCULOSKELETAL: No joint pain or swelling; No muscle, back, or extremity pain  PSYCHIATRIC: No depression, anxiety, mood swings, or difficulty sleeping  HEME/LYMPH: No easy bruising, or bleeding gums  ALLERY AND IMMUNOLOGIC: No hives or eczema	    [ ] All others negative	  [ ] Unable to obtain    PHYSICAL EXAM:  T(C): 36.8 (03-08-19 @ 09:00), Max: 36.9 (03-07-19 @ 20:48)  HR: 95 (03-08-19 @ 09:00) (74 - 95)  BP: 108/74 (03-08-19 @ 09:00) (103/69 - 131/82)  RR: 17 (03-08-19 @ 09:00) (17 - 18)  SpO2: 98% (03-08-19 @ 09:00) (96% - 100%)  Wt(kg): --  I&O's Summary      Appearance: Normal	  HEENT:   Normal oral mucosa, PERRL, EOMI	  Lymphatic: No lymphadenopathy  Cardiovascular: Normal S1 S2, No JVD, No murmurs, No edema  Respiratory: Lungs clear to auscultation	  Psychiatry: A & O x 3, Mood & affect appropriate  Gastrointestinal:  Soft, Non-tender, + BS	  Skin: No rashes, No ecchymoses, No cyanosis	  Neurologic: Non-focal  Extremities: Normal range of motion, No clubbing, cyanosis or edema  Vascular: Peripheral pulses palpable 2+ bilaterally    TELEMETRY: 	SR    ECG:  	NSR, no acute ischemic stt changes  RADIOLOGY:  < from: CT Angio Head w/ IV Cont (03.02.19 @ 20:45) >    EXAM:  CT ANGIO NECK (W)AW IC                          EXAM:  CT ANGIO BRAIN (W)AW IC                          EXAM:  CT BRAIN                            PROCEDURE DATE:  03/02/2019            INTERPRETATION:  CLINICAL INFORMATION: Acute visual defect. Peripheral   vision loss in the right eye more than left and dizziness.    TECHNIQUE: Noncontrast axial CT images were acquired through the head.   Contrast enhanced axial CT images were acquired from the aortic arch to   the vertex of the calvarium, during the angiographic phase. Additional   post-contrast axial CT images through the head were obtained.   Three-dimensional maximum intensity projection reformats were generated   from the angiographic images.  90 cc of Omnipaque-350 mg/ml were  administered intravenously, without immediate complication. Images were   reconstructed using dedicated software and a dedicated 3-D CT workstation   and viewed in multiple planes.    COMPARISON: None.    FINDINGS:     CT BRAIN:    There is no CT evidence of acute intracranial hemorrhage, extra-axial   collection, vasogenic edema, mass effect, midline shift, central   herniation, hydrocephalus or acute territorial infarct.     No areas of abnormal enhancement are identified.    There is mild cerebral volume loss.     The visualized paranasal sinuses are clear. The mastoid air cells and   middle ear cavities are clear.    The soft tissues of the scalp are unremarkable. The calvarium is intact.    CT ANGIOGRAPHY NECK:    Three-vessel aortic arch. Atherosclerotic calcifications of the aorta   without evidence of dissection or hemodynamically significant stenosis.   The origins of the great vessels are unremarkable.     The common carotid arteries are normal in caliber without significant   stenosis.     The carotid bifurcations are unremarkable. The internal carotid arteries   are normal in caliber without significant stenosis.     Co-dominant vertebral arteries. The vertebral arteries are normal in   caliber without significant stenosis.     The visualized neck and upper chest are unremarkable.    Visualized osseous structures are unremarkable.    CT ANGIOGRAPHY BRAIN:    There is no evidence for significant stenosis, major vessel occlusion, or   aneurysm about the [place].    There is no evidence for significant stenosis, major vessel occlusion, or   aneurysm involving the vertebrobasilar system.    No enlarged vascular lesions or clusters of abnormal vessels are noted to   suggest an arterial venous malformation.    Visualized portions of the superficial and deep venous systems are   unremarkable.      IMPRESSION:     CT brain: There is no evidence of acute intracranial hemorrhage,   extra-axial collection, vasogenic edema, mass, mass effect, midline   shift, central herniation, hydrocephalus or transcortical infarct.     CT angiography neck: No evidence of hemodynamically significant stenosis   by NASCET criteria. No evidence of vascular dissection.    CT angiography brain: No major vessel occlusion or proximal stenosis by   NASCET criteria. No evidence of aneurysm or other vascular malformation.      FIORDALIZA SERVIN M.D., RADIOLOGY RESIDENT  This document has been electronically signed.  CYNDI LEVI M.D., ATTENDING RADIOLOGIST  This document has been electronically signed. Mar  2 2019  9:52PM            < from: MR Venogram Head w/wo IV Cont (03.08.19 @ 08:46) >    EXAM:  MR VENOGRAM BRAIN WAW IC                          EXAM:  MR BRAIN                            PROCEDURE DATE:  03/08/2019            INTERPRETATION:    CLINICAL INDICATION: Visual deficit, headache on OCPs      Magnetic resonance imaging of the brain was carried out with transaxial   SPGR, FLAIR, fast spin echo T2 weighted images, axial susceptibility   weighted series, diffusion weighted series and sagittal T1 weighted   series on a 1.5 Renu magnet. Post contrast axial, coronal and sagittal   T1 weighted images were obtained. 8 cc of Gadavist were intravenously   injected,    2 cc were discarded.    Comparison is made with the prior CT/CTA  of 3/2/2019.      Sulci are normal for the patient's age. No masses or hemorrhage are   identified. There are tiny foci of diffusion restriction identified in   the left frontal cortex, in the right occipital region and the left   centrum semiovale ovale. These have the appearance of small acute   infarcts and may be related to emboli emboli or hypercoagulability after   contrast there is light enhancement of the left frontal gyri consistent   with subacute enhancing infarct.    There is a small midline right frontal parasagittal enhancing mass   without mass effect which likely represents a small meningioma. This   enhances solidly and measures 8.9 mm in AP diameter x 11 mm transversely   x 7.3 mm in craniocaudal diameter.    MR venography demonstrates no evidence of venous thrombosis. The normal   intracranial venous circulation is identified. The right transverse sinus   is dominant. The superior sagittal sinus, internal cerebral veins, vein   of Scar, straight sinus, transverse sinuses, sigmoid sinuses and   internal jugular veins are normal. Cortical veins are normal.      IMPRESSION: Several small scattered acute infarcts involving the left   frontal cortex, left centrum semiovale and right occipital lobe   suggesting infarcts in multiple vascular territories. This may be due to   emboli or hypercoagulable state. No evidence of venous thrombosis. A   small right frontal parasagittal extra axial mass likely meningioma.        Dr. Amaya discussed these findings with CDU on 3/8/2019 8:53 AM with read   back.                    INDIA AMAYA M.D., ATTENDING RADIOLOGIST  This document has been electronically signed. Mar  8 2019  9:18AM                < end of copied text >    OTHER: 	  	  LABS:	 	    CARDIAC MARKERS:                                  13.1   8.0   )-----------( 270      ( 07 Mar 2019 14:08 )             37.9     03-07    140  |  103  |  9   ----------------------------<  126<H>  4.4   |  24  |  0.49<L>    Ca    9.7      07 Mar 2019 14:08    TPro  7.4  /  Alb  4.0  /  TBili  0.1<L>  /  DBili  x   /  AST  14  /  ALT  14  /  AlkPhos  53  03-07    proBNP:   Lipid Profile:   HgA1c:   TSH:

## 2019-03-08 NOTE — ED ADULT NURSE REASSESSMENT NOTE - COMFORT CARE
ambulated to bathroom/plan of care explained/po fluids offered
plan of care explained/darkened lights

## 2019-03-08 NOTE — ED CDU PROVIDER DISPOSITION NOTE - PROVIDER TOKENS
PROVIDER:[TOKEN:[[id number]],FOLLOWUP:[1-3 Days]],PROVIDER:[TOKEN:[[id number]],FOLLOWUP:[1-3 Days]]

## 2019-03-08 NOTE — ED CDU PROVIDER DISPOSITION NOTE - PLAN OF CARE
1. Continue your home medications as directed. Take Tylenol 650mg every 6-8 hrs as needed for pain. May also take Motrin 600mg every 8 hrs with food as needed for pain. Take zofran as needed for nausea and vomiting.   2. Drink plenty of fluids to stay hydrated.  3. You will need to follow up with your PMD and neurologist or our neurology clinic at [phone] in 2-3 days. Follow up with your ophthalmologist or our ophtho clinic ([phone]) within 1 week. A copy of your results were given to you to bring to your appt.   4. Return to ER for any worsening or concerning symptoms.

## 2019-03-08 NOTE — ED CDU PROVIDER SUBSEQUENT DAY NOTE - ATTENDING CONTRIBUTION TO CARE
Attending MD Cespedes:   I personally have seen and examined this patient.  Physician assistant note reviewed and agree on plan of care and except where noted.  See below for details.     Seen in CDU4, unaccompanied    38F with PMH/PSH including HLD sent to the CDU for neuro evals, MRI/MRV after returning to the ED for dizziness, blurry vision from the L eye, and headache. Had previous CT head, CTA head, and CTA neck performed, which were nonactionable.  Patient was seen by neurology and ophthalmology.  Neuro recommended MRI/MRV and ophtho consult.  No acute intervention as per Ophtho.  MRI/MRV revealed "several small scattered acute infarcts involving the left frontal cortex, left centrum semiovale and right occipital lobe suggesting infarcts in multiple vascular territories. This may be due to emboli or hypercoagulable state. No evidence of venous thrombosis. A small right frontal parasagittal extra axial mass likely meningioma." Patient reports headache and dizziness resolved and reports L eye visual changes have been improved since 3/2/19.  On exam, NAD, CN 2-12 grossly intact, head NCAT, PERRL, FROM at neck, no tenderness to midline palpation, no stepoffs along length of spine, lungs CTAB with good inspiratory effort, +S1S2, no m/r/g, abdomen soft with +BS, NT, ND, no CVAT, moving all extremities with 5/5 strength bilateral upper and lower extremities, good and equal  strength bilaterally, sensory grossly intact, no calf tenderness, swelling, erythema or warmth; A/P: 38F with acute infarcts on MRI, pending final neuro recs, will await

## 2019-03-08 NOTE — CONSULT NOTE ADULT - ASSESSMENT
39 yo female present with worsening neurological deficits and found to have multiple scattered acute brain infarcts.

## 2019-03-11 LAB
AT III ACT/NOR PPP CHRO: 112 % — SIGNIFICANT CHANGE UP (ref 85–135)
B2 GLYCOPROT1 AB SER QL: NEGATIVE — SIGNIFICANT CHANGE UP
CARDIOLIPIN AB SER-ACNC: NEGATIVE — SIGNIFICANT CHANGE UP
DRVVT SCREEN TO CONFIRM RATIO: SIGNIFICANT CHANGE UP
LA NT DPL PPP QL: 29.5 SEC — SIGNIFICANT CHANGE UP
PROT C ACT/NOR PPP: 157 % — HIGH (ref 74–150)

## 2019-03-12 ENCOUNTER — APPOINTMENT (OUTPATIENT)
Dept: OPHTHALMOLOGY | Facility: CLINIC | Age: 39
End: 2019-03-12
Payer: MEDICAID

## 2019-03-12 PROBLEM — Z00.00 ENCOUNTER FOR PREVENTIVE HEALTH EXAMINATION: Status: ACTIVE | Noted: 2019-03-12

## 2019-03-12 PROCEDURE — 92004 COMPRE OPH EXAM NEW PT 1/>: CPT

## 2019-03-12 PROCEDURE — 92083 EXTENDED VISUAL FIELD XM: CPT

## 2019-03-12 PROCEDURE — 92133 CPTRZD OPH DX IMG PST SGM ON: CPT

## 2019-03-12 NOTE — ED POST DISCHARGE NOTE - RESULT SUMMARY
157 (H) (ref range ) in the setting of 37 y/o F with multiple small strokes on MRI- hypercoag w/u sent as per neuro to be followed outpt

## 2019-03-12 NOTE — ED POST DISCHARGE NOTE - ADDITIONAL DOCUMENTATION
Likely no need for callback as mild increase in protein C would not be a/w hypercoagulable state to my knowledge. d/w Hematology fellow, they state that this is a non-actionable lab. -Odell Prince PA-C

## 2019-03-15 LAB
DNA PLOIDY SPEC FC-IMP: SIGNIFICANT CHANGE UP
MTHFR GENE INTERPRETATION: SIGNIFICANT CHANGE UP

## 2019-04-02 ENCOUNTER — APPOINTMENT (OUTPATIENT)
Dept: CV DIAGNOSITCS | Facility: HOSPITAL | Age: 39
End: 2019-04-02

## 2019-04-02 ENCOUNTER — EMERGENCY (EMERGENCY)
Facility: HOSPITAL | Age: 39
LOS: 1 days | Discharge: HOME | End: 2019-04-02
Attending: EMERGENCY MEDICINE
Payer: COMMERCIAL

## 2019-04-02 ENCOUNTER — OUTPATIENT (OUTPATIENT)
Dept: OUTPATIENT SERVICES | Facility: HOSPITAL | Age: 39
LOS: 1 days | End: 2019-04-02
Payer: COMMERCIAL

## 2019-04-02 VITALS
HEIGHT: 66 IN | DIASTOLIC BLOOD PRESSURE: 88 MMHG | HEART RATE: 99 BPM | RESPIRATION RATE: 18 BRPM | WEIGHT: 188.05 LBS | TEMPERATURE: 99 F | SYSTOLIC BLOOD PRESSURE: 134 MMHG | OXYGEN SATURATION: 99 %

## 2019-04-02 DIAGNOSIS — I25.10 ATHEROSCLEROTIC HEART DISEASE OF NATIVE CORONARY ARTERY WITHOUT ANGINA PECTORIS: ICD-10-CM

## 2019-04-02 PROCEDURE — 93312 ECHO TRANSESOPHAGEAL: CPT

## 2019-04-02 PROCEDURE — 93325 DOPPLER ECHO COLOR FLOW MAPG: CPT

## 2019-04-02 PROCEDURE — 93320 DOPPLER ECHO COMPLETE: CPT | Mod: 26

## 2019-04-02 PROCEDURE — 99282 EMERGENCY DEPT VISIT SF MDM: CPT

## 2019-04-02 PROCEDURE — 99282 EMERGENCY DEPT VISIT SF MDM: CPT | Mod: 25

## 2019-04-02 PROCEDURE — 93325 DOPPLER ECHO COLOR FLOW MAPG: CPT | Mod: 26

## 2019-04-02 PROCEDURE — 93320 DOPPLER ECHO COMPLETE: CPT

## 2019-04-02 PROCEDURE — 93312 ECHO TRANSESOPHAGEAL: CPT | Mod: 26

## 2019-04-02 NOTE — ED PROVIDER NOTE - NSFOLLOWUPINSTRUCTIONS_ED_ALL_ED_FT
Please go to 1st floor Echo department    Return to hospital for any new or concerning symptoms, including but not limited to: fevers, chills, nausea, vomiting, headache, dizziness, lightheadedness, chest pain, shortness of breath, difficulty breathing, abdominal pain, weakness, or any other new or concerning symptoms.

## 2019-04-02 NOTE — ED ADULT NURSE NOTE - OBJECTIVE STATEMENT
ambulatory 38 yr old female a/OX 4.  Sent by Dr. Du for TRACI and Echo.  Pt was registered and triaged in error in ED.  Dr. Du was called and instructed ED staff to discharge patient and send her for outpatient TRACI in cardiology office.  Pt denies any symptoms.  IV was placed by PA student at request of RN.  Removed prior to DC

## 2019-04-02 NOTE — ED PROVIDER NOTE - PROGRESS NOTE DETAILS
Spoke with Dr Du who confirmed patient has a scheduled TRACI today in the echo department. Was only able to reach Dr Du after performing initial HPI, ROS, PE. (attempted to contact prior to patient eval, was unable to get in touch). Will d/c from ED, instruct to go to Echo department  Gerard Quispe MD, PGY2 Emergency Medicine

## 2019-04-02 NOTE — ED PROVIDER NOTE - PHYSICAL EXAMINATION
General: Well appearing, alert, oriented, no acute distress. Resting in bed.  HEENT: PERRLA EOMI. No trauma/bruising noted to head or face.   CV: Regular rate and rhythm, S1/S2, no murmurs/rubs/gallops noted on exam.  Lungs: Clear to ascultation bilaterally, no wheezes/crackles/rales noted on exam.  Abdomen: Soft, non tender, non distended, no guarding or rebound.   MSK: Full ROM of upper and lower extremities bilaterally. Full ROM of neck.   Neuro: Awake, A+O x4, moving all extremities spontaneously. CN 2-12 grossly intact. No nystagmus noted. Strength and sensation grossly intact to all extremities. Ambulatory w/o assist, normal gait.

## 2019-04-02 NOTE — ED PROVIDER NOTE - ATTENDING CONTRIBUTION TO CARE
Patient arrived for TRACI.  In wrong location  ncat  non-tachycardic, non-tachypneic   cooperative  pleasant  will send discharge and send upstairs after having spoken with Dr. Stark  No immediate life threatening issues present on history or clinical exam. Patient is a safe disposition home, has capacity and insight into their condition, is ambulatory in the Emergency Department with no further questions and will follow up with their doctor(s) this week. Patient and family  understand anticipatory guidance were given strict return and follow up precautions.  The patient and family have been informed of all concerning signs and symptoms to return to Emergency Department, the necessity to follow up with the PMD/Clinic/follow up provided within 2-3 days was explained, and the patient and/or family reports understanding of above with capacity and insight.

## 2019-04-02 NOTE — ED PROVIDER NOTE - CLINICAL SUMMARY MEDICAL DECISION MAKING FREE TEXT BOX
Patient showed up in wrong area of hospital, did not need to be seen in emergency department, after calling Cardiology patient was to show up upstairs to echo lab and she arrived in the wrong location but this information was not received until after history and pe.

## 2019-04-02 NOTE — ED PROVIDER NOTE - OBJECTIVE STATEMENT
38F 38F presents to ED for TRACI, stating that she was instructed to come to hospital and be admitted for a TRACI today. Patient denies any current symptoms. Scheduled for TRACI to eval etiology of known multiple infarcts to brain, as per prior documentation in chart. Spoke with Dr Du who stated that patient was instructed to go to 1st floor Echo department and came to wrong area.

## 2019-04-29 ENCOUNTER — NON-APPOINTMENT (OUTPATIENT)
Age: 39
End: 2019-04-29

## 2019-04-29 ENCOUNTER — APPOINTMENT (OUTPATIENT)
Dept: CARDIOLOGY | Facility: CLINIC | Age: 39
End: 2019-04-29
Payer: COMMERCIAL

## 2019-04-29 VITALS
OXYGEN SATURATION: 100 % | HEIGHT: 66 IN | BODY MASS INDEX: 30.53 KG/M2 | WEIGHT: 190 LBS | DIASTOLIC BLOOD PRESSURE: 90 MMHG | SYSTOLIC BLOOD PRESSURE: 133 MMHG | HEART RATE: 98 BPM

## 2019-04-29 DIAGNOSIS — H53.9 UNSPECIFIED VISUAL DISTURBANCE: ICD-10-CM

## 2019-04-29 DIAGNOSIS — Z82.49 FAMILY HISTORY OF ISCHEMIC HEART DISEASE AND OTHER DISEASES OF THE CIRCULATORY SYSTEM: ICD-10-CM

## 2019-04-29 PROCEDURE — 93000 ELECTROCARDIOGRAM COMPLETE: CPT

## 2019-04-29 PROCEDURE — 99205 OFFICE O/P NEW HI 60 MIN: CPT

## 2019-04-29 NOTE — PHYSICAL EXAM
[General Appearance - Well Developed] : well developed [Normal Appearance] : normal appearance [Well Groomed] : well groomed [General Appearance - Well Nourished] : well nourished [No Deformities] : no deformities [General Appearance - In No Acute Distress] : no acute distress [Normal Conjunctiva] : the conjunctiva exhibited no abnormalities [Eyelids - No Xanthelasma] : the eyelids demonstrated no xanthelasmas [Normal Oral Mucosa] : normal oral mucosa [No Oral Pallor] : no oral pallor [No Oral Cyanosis] : no oral cyanosis [Normal Jugular Venous A Waves Present] : normal jugular venous A waves present [Normal Jugular Venous V Waves Present] : normal jugular venous V waves present [No Jugular Venous Chavez A Waves] : no jugular venous chavez A waves [Heart Rate And Rhythm] : heart rate and rhythm were normal [Heart Sounds] : normal S1 and S2 [Murmurs] : no murmurs present [Respiration, Rhythm And Depth] : normal respiratory rhythm and effort [Exaggerated Use Of Accessory Muscles For Inspiration] : no accessory muscle use [Auscultation Breath Sounds / Voice Sounds] : lungs were clear to auscultation bilaterally [Abdomen Soft] : soft [Abdomen Tenderness] : non-tender [Abdomen Mass (___ Cm)] : no abdominal mass palpated [Abnormal Walk] : normal gait [Gait - Sufficient For Exercise Testing] : the gait was sufficient for exercise testing [Nail Clubbing] : no clubbing of the fingernails [Cyanosis, Localized] : no localized cyanosis [Petechial Hemorrhages (___cm)] : no petechial hemorrhages [Skin Color & Pigmentation] : normal skin color and pigmentation [] : no rash [No Venous Stasis] : no venous stasis [Skin Lesions] : no skin lesions [No Skin Ulcers] : no skin ulcer [No Xanthoma] : no  xanthoma was observed [Oriented To Time, Place, And Person] : oriented to person, place, and time [Affect] : the affect was normal [Mood] : the mood was normal [No Anxiety] : not feeling anxious

## 2019-04-29 NOTE — DISCUSSION/SUMMARY
[FreeTextEntry1] : Patient with embolic, cryptogenic stroke.  TRACI was reviewed and shows an intracardiac right to left shunt at the atrial level.  This defect is associated with an atrial septal aneurysm and a significant number of bubbles crossing on bubble injection.  I feel that this patient has high risk characteristics for recurrent embolic stroke.  Because of her palpitations, She will be monitored to rule out arrhythmia.  Probably percutaneous closure to be scheduled in one month.  The procedure, risks and benefits were explained in detail to the patient.

## 2019-04-29 NOTE — HISTORY OF PRESENT ILLNESS
[FreeTextEntry1] : 38 year odl woman in good health.  About one month ago, she presented with visual disturbances and was found to have embolic CVA's on MRI.  She was on BCP at the time.  TRACI showed the presence of an intracardiac shunt.  Since her discharge, she has noted tachycardia and palpitations.

## 2019-05-28 ENCOUNTER — APPOINTMENT (OUTPATIENT)
Dept: ELECTROPHYSIOLOGY | Facility: CLINIC | Age: 39
End: 2019-05-28
Payer: COMMERCIAL

## 2019-05-28 VITALS
DIASTOLIC BLOOD PRESSURE: 96 MMHG | HEART RATE: 105 BPM | OXYGEN SATURATION: 99 % | SYSTOLIC BLOOD PRESSURE: 142 MMHG | TEMPERATURE: 98.2 F

## 2019-05-28 PROCEDURE — 99204 OFFICE O/P NEW MOD 45 MIN: CPT

## 2019-05-28 PROCEDURE — 93000 ELECTROCARDIOGRAM COMPLETE: CPT

## 2019-05-28 RX ORDER — MULTIVITAMIN
TABLET ORAL
Refills: 0 | Status: ACTIVE | COMMUNITY

## 2019-06-06 ENCOUNTER — TRANSCRIPTION ENCOUNTER (OUTPATIENT)
Age: 39
End: 2019-06-06

## 2019-06-06 ENCOUNTER — INPATIENT (INPATIENT)
Facility: HOSPITAL | Age: 39
LOS: 0 days | Discharge: HOME | DRG: 274 | End: 2019-06-07
Attending: INTERNAL MEDICINE | Admitting: INTERNAL MEDICINE
Payer: COMMERCIAL

## 2019-06-06 VITALS
OXYGEN SATURATION: 97 % | RESPIRATION RATE: 18 BRPM | HEIGHT: 66 IN | SYSTOLIC BLOOD PRESSURE: 116 MMHG | TEMPERATURE: 98 F | WEIGHT: 190.04 LBS | HEART RATE: 94 BPM | DIASTOLIC BLOOD PRESSURE: 72 MMHG

## 2019-06-06 DIAGNOSIS — Q21.9 CONGENITAL MALFORMATION OF CARDIAC SEPTUM, UNSPECIFIED: ICD-10-CM

## 2019-06-06 DIAGNOSIS — Z98.891 HISTORY OF UTERINE SCAR FROM PREVIOUS SURGERY: Chronic | ICD-10-CM

## 2019-06-06 LAB
ALBUMIN SERPL ELPH-MCNC: 4.3 G/DL — SIGNIFICANT CHANGE UP (ref 3.3–5)
ALP SERPL-CCNC: 81 U/L — SIGNIFICANT CHANGE UP (ref 40–120)
ALT FLD-CCNC: 39 U/L — SIGNIFICANT CHANGE UP (ref 10–45)
ANION GAP SERPL CALC-SCNC: 13 MMOL/L — SIGNIFICANT CHANGE UP (ref 5–17)
AST SERPL-CCNC: 20 U/L — SIGNIFICANT CHANGE UP (ref 10–40)
BILIRUB SERPL-MCNC: 0.1 MG/DL — LOW (ref 0.2–1.2)
BUN SERPL-MCNC: 7 MG/DL — SIGNIFICANT CHANGE UP (ref 7–23)
CALCIUM SERPL-MCNC: 9.6 MG/DL — SIGNIFICANT CHANGE UP (ref 8.4–10.5)
CHLORIDE SERPL-SCNC: 105 MMOL/L — SIGNIFICANT CHANGE UP (ref 96–108)
CO2 SERPL-SCNC: 24 MMOL/L — SIGNIFICANT CHANGE UP (ref 22–31)
CREAT SERPL-MCNC: 0.55 MG/DL — SIGNIFICANT CHANGE UP (ref 0.5–1.3)
GLUCOSE SERPL-MCNC: 110 MG/DL — HIGH (ref 70–99)
HCG SERPL-ACNC: <2 MIU/ML — SIGNIFICANT CHANGE UP
HCT VFR BLD CALC: 36.5 % — SIGNIFICANT CHANGE UP (ref 34.5–45)
HGB BLD-MCNC: 12 G/DL — SIGNIFICANT CHANGE UP (ref 11.5–15.5)
MCHC RBC-ENTMCNC: 25 PG — LOW (ref 27–34)
MCHC RBC-ENTMCNC: 32.8 GM/DL — SIGNIFICANT CHANGE UP (ref 32–36)
MCV RBC AUTO: 76.2 FL — LOW (ref 80–100)
PLATELET # BLD AUTO: 298 K/UL — SIGNIFICANT CHANGE UP (ref 150–400)
POTASSIUM SERPL-MCNC: 4.1 MMOL/L — SIGNIFICANT CHANGE UP (ref 3.5–5.3)
POTASSIUM SERPL-SCNC: 4.1 MMOL/L — SIGNIFICANT CHANGE UP (ref 3.5–5.3)
PROT SERPL-MCNC: 7.6 G/DL — SIGNIFICANT CHANGE UP (ref 6–8.3)
RBC # BLD: 4.79 M/UL — SIGNIFICANT CHANGE UP (ref 3.8–5.2)
RBC # FLD: 14 % — SIGNIFICANT CHANGE UP (ref 10.3–14.5)
SODIUM SERPL-SCNC: 142 MMOL/L — SIGNIFICANT CHANGE UP (ref 135–145)
WBC # BLD: 8.2 K/UL — SIGNIFICANT CHANGE UP (ref 3.8–10.5)
WBC # FLD AUTO: 8.2 K/UL — SIGNIFICANT CHANGE UP (ref 3.8–10.5)

## 2019-06-06 PROCEDURE — 93321 DOPPLER ECHO F-UP/LMTD STD: CPT | Mod: 26

## 2019-06-06 PROCEDURE — 93010 ELECTROCARDIOGRAM REPORT: CPT

## 2019-06-06 PROCEDURE — 93010 ELECTROCARDIOGRAM REPORT: CPT | Mod: 76

## 2019-06-06 PROCEDURE — 93308 TTE F-UP OR LMTD: CPT | Mod: 26

## 2019-06-06 RX ORDER — CLOPIDOGREL BISULFATE 75 MG/1
1 TABLET, FILM COATED ORAL
Qty: 30 | Refills: 11
Start: 2019-06-06 | End: 2020-05-30

## 2019-06-06 RX ORDER — METOPROLOL TARTRATE 50 MG
25 TABLET ORAL DAILY
Refills: 0 | Status: DISCONTINUED | OUTPATIENT
Start: 2019-06-06 | End: 2019-06-07

## 2019-06-06 RX ORDER — ASPIRIN/CALCIUM CARB/MAGNESIUM 324 MG
81 TABLET ORAL DAILY
Refills: 0 | Status: DISCONTINUED | OUTPATIENT
Start: 2019-06-06 | End: 2019-06-07

## 2019-06-06 RX ORDER — ATORVASTATIN CALCIUM 80 MG/1
80 TABLET, FILM COATED ORAL AT BEDTIME
Refills: 0 | Status: DISCONTINUED | OUTPATIENT
Start: 2019-06-06 | End: 2019-06-07

## 2019-06-06 RX ORDER — METOPROLOL TARTRATE 50 MG
1 TABLET ORAL
Qty: 0 | Refills: 0 | DISCHARGE

## 2019-06-06 RX ORDER — CLOPIDOGREL BISULFATE 75 MG/1
75 TABLET, FILM COATED ORAL DAILY
Refills: 0 | Status: DISCONTINUED | OUTPATIENT
Start: 2019-06-06 | End: 2019-06-07

## 2019-06-06 RX ORDER — ATORVASTATIN CALCIUM 80 MG/1
1 TABLET, FILM COATED ORAL
Qty: 0 | Refills: 0 | DISCHARGE

## 2019-06-06 RX ORDER — ACETAMINOPHEN 500 MG
650 TABLET ORAL EVERY 6 HOURS
Refills: 0 | Status: DISCONTINUED | OUTPATIENT
Start: 2019-06-06 | End: 2019-06-07

## 2019-06-06 RX ADMIN — ATORVASTATIN CALCIUM 80 MILLIGRAM(S): 80 TABLET, FILM COATED ORAL at 21:24

## 2019-06-06 NOTE — DISCHARGE NOTE PROVIDER - NSDCFUADDINST_GEN_ALL_CORE_FT
No heavy lifting for 2 weeks, no strenuous activity  or uneccesary stair climbing, no driving for x 2 days,  you may shower 24 hours following procedure but no bathing or swimming for x1  week, no bending, no straining while having a Bowel movement, No strenuous sexual activity for x 1 week check groin for bleeding, pain, tightness or ( golf ball size)  swelling daily following procedure , & follow up with your cardiologist in 1-2 week

## 2019-06-06 NOTE — CHART NOTE - NSCHARTNOTEFT_GEN_A_CORE
Removal of Femoral Sheath    Pulses in the right/left lower extremity are palpable.  The patient was placed in the supine position. The insertion site was identified and the sutures were removed per protocol.    The ___8 & 9_ Iranian Venous femoral sheath was then removed by BRY Hoffman  Direct pressure was applied for  __20____ minutes.   Complications: None, VSS, Good Hemostasis.     Monitoring of the right/left groin and both lower extremities including neuro-vascular checks and vital signs every 15 minutes x 4, then every 30 minutes x 2, then every 1 hour was ordered.    Discharge Instruction discussed with patient: ASA, Plavix/Brilinta, statin, diet, activities, access site care, follow up care, reportable signs and symptoms.       s/p PFO closure    Plan: continue to monitor, Continue ASA, Plavix, Prophylactic ABX for minimal invasive procedure within 6 months.   Echocardiogram prior to discharge    Pt will follow up with her cardiologist in 1-2weeks.

## 2019-06-06 NOTE — H&P CARDIOLOGY - HISTORY OF PRESENT ILLNESS
38 year old  Israeli woman presented with visual disturbances and was found to have embolic CVA's on MRI. TRACI showed the presence of an intracardiac shunt. Since her discharge, she has noted tachycardia and palpitations. Pt presents today for PFO Closure. 38 year old  Lebanese woman presented with visual disturbances and was found to have embolic CVA's on MRI. TRACI showed the presence of an intracardiac shunt. Since her discharge, she has noted tachycardia and palpitations. Pt presents today for PFO Closure.      Pt reports dry cough for past 2 days, pt afebrile and lungs clear on auscultation.

## 2019-06-06 NOTE — DISCHARGE NOTE PROVIDER - NSDCCPCAREPLAN_GEN_ALL_CORE_FT
PRINCIPAL DISCHARGE DIAGNOSIS  Diagnosis: Embolic stroke  Assessment and Plan of Treatment: No heavy lifting, strenuous activity, bending, straining or unnecessary stair climbing  for 2 weeks. No sex for 1 week.  No driving for 2 days. You may shower 24 hours following procedure but avoid baths and swimming for 1 week. Check groin site for bleeding and/or swelling daily following procedure. Call your doctor/cardiologist immediately should it occur or if you have increased/persistent pain at the site. Follow up with your cardiologist in 1- 2 weeks. You may call [facility] at [phone] or [phone] after office hours and weekends  with any questions or concerns following your procedure. Take medications as prescribed.

## 2019-06-06 NOTE — H&P CARDIOLOGY - PMH
Cerebrovascular accident (CVA) due to embolism of other cerebral artery    Hyperlipidemia    Prediabetes

## 2019-06-06 NOTE — DISCHARGE NOTE PROVIDER - NSDCCPTREATMENT_GEN_ALL_CORE_FT
PRINCIPAL PROCEDURE  Procedure: Repair of patent foramen ovale (PFO) in adult  Findings and Treatment: PRINCIPAL PROCEDURE  Procedure: Repair of patent foramen ovale (PFO) in adult  Findings and Treatment: PLAVIX only for 3 months, then Aspirin alone      SECONDARY PROCEDURE  Procedure: Transthoracic echo  Findings and Treatment: atrial septal device in the interatrial septum and appears well seated with no residual flow by color doppler

## 2019-06-06 NOTE — DISCHARGE NOTE PROVIDER - NSDCFUADDAPPT_GEN_ALL_CORE_FT
Prophylactic antibiotic  for minimal invasive procedure within 6 months. Aspirin and Plavix x 3 months the aspirin alone. Prophylactic antibiotic  for minimal invasive procedure within 6 months.

## 2019-06-06 NOTE — DISCHARGE NOTE PROVIDER - HOSPITAL COURSE
38 year old  Malaysian woman presented with visual disturbances and was found to have embolic CVA's on MRI. TRACI showed the presence of an intracardiac shunt. Since her discharge, she has noted tachycardia and palpitations. Pt presents today for PFO Closure.  Pt is now s/p PFO Closure via right femoral artery access. Pt tolerated the procedure well, site benign. Post-procedure discharge instructions discussed and questions addressed 38 year old  Ind[place]ian woman presented with visual disturbances and was found to have embolic CVA's on MRI. TRACI showed the presence of an intracardiac shunt. Since her discharge, she has noted tachycardia and palpitations. Pt presents today for PFO Closure.  Pt is now s/p PFO Closure via right femoral artery access. Pt tolerated the procedure well, site benign. Post-procedure discharge instructions discussed and questions addressed            INTERVENTIONAL RECOMMENDATIONS:     Echo prior to discharge performed     ASA and Plavix for 3 months then ASA alone    SBE prophylaxis for 6 months    Prepared and signed by    Randall Arauz M.D.            HISTORY: History includes congenital heart disease.    PROCEDURE:    --  Right heart catheterization.    --  Sonosite - Diagnostic.    --  ASD device closure.    --  Intracardiac Echocardiogram.    --  PFO Closure.            < from: Limited Transthoracic Echo (06.06.19 @ 20:04) >        1. Normal mitral valve. Minimal mitral regurgitation.    2. Normal trileaflet aortic valve. No aortic valve    regurgitation seen.    3. An atrial septal closure device is seen on the    interatrial septum and appears well-seated with no residual    interatrial flow by color Doppler.    4. Normal left ventricular systolic function. No segmental    wall motion abnormalities.    5. Normal pericardium with no pericardial effusion.        < end of copied text >

## 2019-06-06 NOTE — DISCHARGE NOTE PROVIDER - CARE PROVIDER_API CALL
Randall Arauz)  Cardiology; Internal Medicine; Interventional Cardiology  [address] [place] [address]  Phone: [phone]  Fax: [phone]  Follow Up Time:

## 2019-06-07 ENCOUNTER — TRANSCRIPTION ENCOUNTER (OUTPATIENT)
Age: 39
End: 2019-06-07

## 2019-06-07 VITALS
RESPIRATION RATE: 18 BRPM | HEART RATE: 80 BPM | SYSTOLIC BLOOD PRESSURE: 115 MMHG | OXYGEN SATURATION: 100 % | DIASTOLIC BLOOD PRESSURE: 73 MMHG

## 2019-06-07 LAB
ANION GAP SERPL CALC-SCNC: 10 MMOL/L — SIGNIFICANT CHANGE UP (ref 5–17)
BUN SERPL-MCNC: 9 MG/DL — SIGNIFICANT CHANGE UP (ref 7–23)
CALCIUM SERPL-MCNC: 9.2 MG/DL — SIGNIFICANT CHANGE UP (ref 8.4–10.5)
CHLORIDE SERPL-SCNC: 106 MMOL/L — SIGNIFICANT CHANGE UP (ref 96–108)
CO2 SERPL-SCNC: 24 MMOL/L — SIGNIFICANT CHANGE UP (ref 22–31)
CREAT SERPL-MCNC: 0.63 MG/DL — SIGNIFICANT CHANGE UP (ref 0.5–1.3)
GLUCOSE SERPL-MCNC: 106 MG/DL — HIGH (ref 70–99)
HCT VFR BLD CALC: 33.6 % — LOW (ref 34.5–45)
HGB BLD-MCNC: 11.1 G/DL — LOW (ref 11.5–15.5)
MCHC RBC-ENTMCNC: 25.2 PG — LOW (ref 27–34)
MCHC RBC-ENTMCNC: 33 GM/DL — SIGNIFICANT CHANGE UP (ref 32–36)
MCV RBC AUTO: 76.4 FL — LOW (ref 80–100)
PLATELET # BLD AUTO: 267 K/UL — SIGNIFICANT CHANGE UP (ref 150–400)
POTASSIUM SERPL-MCNC: 4.3 MMOL/L — SIGNIFICANT CHANGE UP (ref 3.5–5.3)
POTASSIUM SERPL-SCNC: 4.3 MMOL/L — SIGNIFICANT CHANGE UP (ref 3.5–5.3)
RBC # BLD: 4.4 M/UL — SIGNIFICANT CHANGE UP (ref 3.8–5.2)
RBC # FLD: 14.1 % — SIGNIFICANT CHANGE UP (ref 10.3–14.5)
SODIUM SERPL-SCNC: 140 MMOL/L — SIGNIFICANT CHANGE UP (ref 135–145)
WBC # BLD: 9.4 K/UL — SIGNIFICANT CHANGE UP (ref 3.8–10.5)
WBC # FLD AUTO: 9.4 K/UL — SIGNIFICANT CHANGE UP (ref 3.8–10.5)

## 2019-06-07 PROCEDURE — 99238 HOSP IP/OBS DSCHRG MGMT 30/<: CPT

## 2019-06-07 RX ADMIN — Medication 81 MILLIGRAM(S): at 05:26

## 2019-06-07 RX ADMIN — CLOPIDOGREL BISULFATE 75 MILLIGRAM(S): 75 TABLET, FILM COATED ORAL at 05:25

## 2019-06-07 NOTE — DISCHARGE NOTE NURSING/CASE MANAGEMENT/SOCIAL WORK - NSDCDPATPORTLINK_GEN_ALL_CORE
You can access the [facility] Patient Portal, offered by [facility], by registering with the following website: [email or web address]

## 2019-06-07 NOTE — DISCHARGE NOTE NURSING/CASE MANAGEMENT/SOCIAL WORK - NSDCFUADDAPPT_GEN_ALL_CORE_FT
Aspirin and Plavix x 3 months the aspirin alone. Prophylactic antibiotic  for minimal invasive procedure within 6 months.

## 2019-06-07 NOTE — DISCHARGE NOTE NURSING/CASE MANAGEMENT/SOCIAL WORK - NSDCPEPTSTRK_GEN_ALL_CORE
Prescribed medications/Risk factors for stroke/Stroke support groups for patients, families, and friends/Stroke warning signs and symptoms/Signs and symptoms of stroke/Call 911 for stroke/Need for follow up after discharge/Stroke education booklet

## 2019-06-10 PROBLEM — I63.49: Chronic | Status: ACTIVE | Noted: 2019-06-06

## 2019-06-30 ENCOUNTER — FORM ENCOUNTER (OUTPATIENT)
Age: 39
End: 2019-06-30

## 2019-07-01 ENCOUNTER — OUTPATIENT (OUTPATIENT)
Dept: OUTPATIENT SERVICES | Facility: HOSPITAL | Age: 39
LOS: 1 days | Discharge: HOME | End: 2019-07-01
Payer: COMMERCIAL

## 2019-07-01 ENCOUNTER — APPOINTMENT (OUTPATIENT)
Dept: CARDIOLOGY | Facility: CLINIC | Age: 39
End: 2019-07-01
Payer: COMMERCIAL

## 2019-07-01 VITALS
OXYGEN SATURATION: 100 % | TEMPERATURE: 99.6 F | SYSTOLIC BLOOD PRESSURE: 139 MMHG | HEART RATE: 90 BPM | DIASTOLIC BLOOD PRESSURE: 97 MMHG

## 2019-07-01 DIAGNOSIS — Z98.891 HISTORY OF UTERINE SCAR FROM PREVIOUS SURGERY: Chronic | ICD-10-CM

## 2019-07-01 PROCEDURE — 93306 TTE W/DOPPLER COMPLETE: CPT | Mod: 26

## 2019-07-01 PROCEDURE — 99214 OFFICE O/P EST MOD 30 MIN: CPT

## 2019-07-01 PROCEDURE — 93000 ELECTROCARDIOGRAM COMPLETE: CPT

## 2019-07-01 NOTE — PHYSICAL EXAM
[General Appearance - Well Developed] : well developed [Normal Appearance] : normal appearance [Well Groomed] : well groomed [General Appearance - Well Nourished] : well nourished [No Deformities] : no deformities [General Appearance - In No Acute Distress] : no acute distress [Normal Conjunctiva] : the conjunctiva exhibited no abnormalities [Eyelids - No Xanthelasma] : the eyelids demonstrated no xanthelasmas [Normal Oral Mucosa] : normal oral mucosa [No Oral Pallor] : no oral pallor [No Oral Cyanosis] : no oral cyanosis [Normal Jugular Venous A Waves Present] : normal jugular venous A waves present [Normal Jugular Venous V Waves Present] : normal jugular venous V waves present [No Jugular Venous Chavez A Waves] : no jugular venous chavez A waves [Respiration, Rhythm And Depth] : normal respiratory rhythm and effort [Exaggerated Use Of Accessory Muscles For Inspiration] : no accessory muscle use [Auscultation Breath Sounds / Voice Sounds] : lungs were clear to auscultation bilaterally [Heart Rate And Rhythm] : heart rate and rhythm were normal [Heart Sounds] : normal S1 and S2 [Murmurs] : no murmurs present [Abdomen Soft] : soft [Abdomen Tenderness] : non-tender [Abdomen Mass (___ Cm)] : no abdominal mass palpated [Abnormal Walk] : normal gait [Gait - Sufficient For Exercise Testing] : the gait was sufficient for exercise testing [Nail Clubbing] : no clubbing of the fingernails [Cyanosis, Localized] : no localized cyanosis [Petechial Hemorrhages (___cm)] : no petechial hemorrhages [Skin Color & Pigmentation] : normal skin color and pigmentation [] : no rash [No Venous Stasis] : no venous stasis [Skin Lesions] : no skin lesions [No Skin Ulcers] : no skin ulcer [No Xanthoma] : no  xanthoma was observed [Oriented To Time, Place, And Person] : oriented to person, place, and time [Affect] : the affect was normal [Mood] : the mood was normal [No Anxiety] : not feeling anxious

## 2019-07-01 NOTE — HISTORY OF PRESENT ILLNESS
[FreeTextEntry1] : Patient is 1 month s/p PFO closure.  No palpitations, dyspnea, neurological symptoms.  Main complaint is constipation.

## 2019-07-11 DIAGNOSIS — Z87.74 PERSONAL HISTORY OF (CORRECTED) CONGENITAL MALFORMATIONS OF HEART AND CIRCULATORY SYSTEM: ICD-10-CM

## 2019-07-21 NOTE — REASON FOR VISIT
[Consultation] : a consultation regarding [Supraventricular Tachycardia] : supraventricular tachycardia [FreeTextEntry1] : This is a 39-year-old woman, who, approximately 2 months ago, developed visual disturbances, and was found to have an embolic CVA. A transesophageal echo demonstrated intracardiac shunt. She was also found to have palpitations that were persistent and associated with a sinus tachycardia. She reports that she feels the palpitations and her rapid heartbeat regularly.  ZAKIYA VALENCIAQI  denies chest pain, chest pressure, shortness of breath, lightheadedness, dizziness, palpitations, syncope, presyncope, orthopnea, PND, or edema.

## 2019-07-21 NOTE — ASSESSMENT
[FreeTextEntry1] : This is a 39 year old woman with an intracardiac shunt and inappropriate sinus tachycardia. She is symptomatic with the tachycardia. Would recommend low dose beta blocker. for symptom control while plan for intracardiac shunt is completed.

## 2019-08-20 ENCOUNTER — RESULT REVIEW (OUTPATIENT)
Age: 39
End: 2019-08-20

## 2019-08-20 ENCOUNTER — OUTPATIENT (OUTPATIENT)
Dept: OUTPATIENT SERVICES | Facility: HOSPITAL | Age: 39
LOS: 1 days | Discharge: HOME | End: 2019-08-20

## 2019-08-20 DIAGNOSIS — Z98.891 HISTORY OF UTERINE SCAR FROM PREVIOUS SURGERY: Chronic | ICD-10-CM

## 2019-08-21 LAB — SURGICAL PATHOLOGY STUDY: SIGNIFICANT CHANGE UP

## 2019-09-10 ENCOUNTER — APPOINTMENT (OUTPATIENT)
Dept: OPHTHALMOLOGY | Facility: CLINIC | Age: 39
End: 2019-09-10

## 2019-09-30 ENCOUNTER — NON-APPOINTMENT (OUTPATIENT)
Age: 39
End: 2019-09-30

## 2019-09-30 ENCOUNTER — APPOINTMENT (OUTPATIENT)
Dept: CARDIOLOGY | Facility: CLINIC | Age: 39
End: 2019-09-30
Payer: COMMERCIAL

## 2019-09-30 VITALS
WEIGHT: 190 LBS | SYSTOLIC BLOOD PRESSURE: 116 MMHG | DIASTOLIC BLOOD PRESSURE: 78 MMHG | HEART RATE: 104 BPM | OXYGEN SATURATION: 99 % | BODY MASS INDEX: 30.53 KG/M2 | HEIGHT: 66 IN

## 2019-09-30 DIAGNOSIS — Q24.8 OTHER SPECIFIED CONGENITAL MALFORMATIONS OF HEART: ICD-10-CM

## 2019-09-30 PROCEDURE — 93000 ELECTROCARDIOGRAM COMPLETE: CPT

## 2019-09-30 PROCEDURE — 99214 OFFICE O/P EST MOD 30 MIN: CPT

## 2019-09-30 RX ORDER — ATORVASTATIN CALCIUM 80 MG/1
80 TABLET, FILM COATED ORAL DAILY
Qty: 90 | Refills: 3 | Status: DISCONTINUED | COMMUNITY
Start: 1900-01-01 | End: 2019-09-30

## 2019-09-30 NOTE — HISTORY OF PRESENT ILLNESS
[FreeTextEntry1] : Patient is 3 month s/p PFO closure.  No palpitations, dyspnea, neurological symptoms.  She has stopped Lipitor.

## 2019-09-30 NOTE — DISCUSSION/SUMMARY
[FreeTextEntry1] : Doing well s/p PFO closure. She can stop Plavix.  See in 3 months, echo and lipid panel at that time.

## 2019-10-31 PROCEDURE — 93005 ELECTROCARDIOGRAM TRACING: CPT

## 2019-10-31 PROCEDURE — 93321 DOPPLER ECHO F-UP/LMTD STD: CPT

## 2019-10-31 PROCEDURE — 93662 INTRACARDIAC ECG (ICE): CPT

## 2019-10-31 PROCEDURE — C1887: CPT

## 2019-10-31 PROCEDURE — 80053 COMPREHEN METABOLIC PANEL: CPT

## 2019-10-31 PROCEDURE — 93580 TRANSCATH CLOSURE OF ASD: CPT

## 2019-10-31 PROCEDURE — 85027 COMPLETE CBC AUTOMATED: CPT

## 2019-10-31 PROCEDURE — 80048 BASIC METABOLIC PNL TOTAL CA: CPT

## 2019-10-31 PROCEDURE — 99153 MOD SED SAME PHYS/QHP EA: CPT

## 2019-10-31 PROCEDURE — C1769: CPT

## 2019-10-31 PROCEDURE — 84702 CHORIONIC GONADOTROPIN TEST: CPT

## 2019-10-31 PROCEDURE — C1759: CPT

## 2019-10-31 PROCEDURE — 93308 TTE F-UP OR LMTD: CPT

## 2019-10-31 PROCEDURE — C1894: CPT

## 2019-10-31 PROCEDURE — C1817: CPT

## 2019-10-31 PROCEDURE — 99152 MOD SED SAME PHYS/QHP 5/>YRS: CPT

## 2019-11-26 NOTE — ED ADULT NURSE NOTE - CHIEF COMPLAINT QUOTE
Great improvement in bp reading.  Continue on same dose of lisinopril.  Cough may just be his copd.  If he should choose to change meds, he should call.   Lt eye partial vision loss onset 1 hour

## 2019-12-18 ENCOUNTER — RX CHANGE (OUTPATIENT)
Age: 39
End: 2019-12-18

## 2019-12-23 ENCOUNTER — RX CHANGE (OUTPATIENT)
Age: 39
End: 2019-12-23

## 2019-12-29 ENCOUNTER — FORM ENCOUNTER (OUTPATIENT)
Age: 39
End: 2019-12-29

## 2019-12-30 ENCOUNTER — APPOINTMENT (OUTPATIENT)
Dept: CARDIOLOGY | Facility: CLINIC | Age: 39
End: 2019-12-30
Payer: COMMERCIAL

## 2019-12-30 ENCOUNTER — NON-APPOINTMENT (OUTPATIENT)
Age: 39
End: 2019-12-30

## 2019-12-30 ENCOUNTER — OUTPATIENT (OUTPATIENT)
Dept: OUTPATIENT SERVICES | Facility: HOSPITAL | Age: 39
LOS: 1 days | End: 2019-12-30
Payer: COMMERCIAL

## 2019-12-30 VITALS
OXYGEN SATURATION: 98 % | SYSTOLIC BLOOD PRESSURE: 115 MMHG | HEART RATE: 85 BPM | DIASTOLIC BLOOD PRESSURE: 73 MMHG | HEIGHT: 66 IN | BODY MASS INDEX: 30.86 KG/M2 | WEIGHT: 192 LBS

## 2019-12-30 DIAGNOSIS — R00.0 TACHYCARDIA, UNSPECIFIED: ICD-10-CM

## 2019-12-30 DIAGNOSIS — I63.9 CEREBRAL INFARCTION, UNSPECIFIED: ICD-10-CM

## 2019-12-30 DIAGNOSIS — Z87.74 PERSONAL HISTORY OF (CORRECTED) CONGENITAL MALFORMATIONS OF HEART AND CIRCULATORY SYSTEM: ICD-10-CM

## 2019-12-30 DIAGNOSIS — Z98.891 HISTORY OF UTERINE SCAR FROM PREVIOUS SURGERY: Chronic | ICD-10-CM

## 2019-12-30 PROCEDURE — 99214 OFFICE O/P EST MOD 30 MIN: CPT

## 2019-12-30 PROCEDURE — 80061 LIPID PANEL: CPT

## 2019-12-30 PROCEDURE — 36415 COLL VENOUS BLD VENIPUNCTURE: CPT

## 2019-12-30 PROCEDURE — 93000 ELECTROCARDIOGRAM COMPLETE: CPT

## 2019-12-30 PROCEDURE — 93306 TTE W/DOPPLER COMPLETE: CPT | Mod: 26

## 2019-12-30 PROCEDURE — 93306 TTE W/DOPPLER COMPLETE: CPT

## 2019-12-30 RX ORDER — CLOPIDOGREL BISULFATE 75 MG/1
75 TABLET, FILM COATED ORAL
Qty: 90 | Refills: 3 | Status: DISCONTINUED | COMMUNITY
Start: 2019-05-06 | End: 2019-12-30

## 2019-12-30 NOTE — DISCUSSION/SUMMARY
[FreeTextEntry1] : Doing well s/p PFO closure. She can stop ASA.  TTE looks good.  Lipid panel pending.  Will also taper off metoprolol.  See in 6 months.

## 2019-12-30 NOTE — HISTORY OF PRESENT ILLNESS
[FreeTextEntry1] : Patient is s/p PFO closure 6/2019.  No palpitations, dyspnea, neurological symptoms.  She has stopped Lipitor and Polavix, continues on ASA.

## 2019-12-30 NOTE — PHYSICAL EXAM
[General Appearance - Well Developed] : well developed [Well Groomed] : well groomed [General Appearance - Well Nourished] : well nourished [Normal Appearance] : normal appearance [No Deformities] : no deformities [General Appearance - In No Acute Distress] : no acute distress [Eyelids - No Xanthelasma] : the eyelids demonstrated no xanthelasmas [Normal Conjunctiva] : the conjunctiva exhibited no abnormalities [Normal Oral Mucosa] : normal oral mucosa [No Oral Pallor] : no oral pallor [Normal Jugular Venous V Waves Present] : normal jugular venous V waves present [Normal Jugular Venous A Waves Present] : normal jugular venous A waves present [No Oral Cyanosis] : no oral cyanosis [No Jugular Venous Chavez A Waves] : no jugular venous chavez A waves [Respiration, Rhythm And Depth] : normal respiratory rhythm and effort [Auscultation Breath Sounds / Voice Sounds] : lungs were clear to auscultation bilaterally [Exaggerated Use Of Accessory Muscles For Inspiration] : no accessory muscle use [Heart Rate And Rhythm] : heart rate and rhythm were normal [Heart Sounds] : normal S1 and S2 [Murmurs] : no murmurs present [Abdomen Soft] : soft [Abdomen Tenderness] : non-tender [Abdomen Mass (___ Cm)] : no abdominal mass palpated [Gait - Sufficient For Exercise Testing] : the gait was sufficient for exercise testing [Abnormal Walk] : normal gait [Petechial Hemorrhages (___cm)] : no petechial hemorrhages [Nail Clubbing] : no clubbing of the fingernails [Cyanosis, Localized] : no localized cyanosis [Skin Color & Pigmentation] : normal skin color and pigmentation [] : no rash [Skin Lesions] : no skin lesions [No Skin Ulcers] : no skin ulcer [No Venous Stasis] : no venous stasis [Oriented To Time, Place, And Person] : oriented to person, place, and time [No Xanthoma] : no  xanthoma was observed [Mood] : the mood was normal [No Anxiety] : not feeling anxious [Affect] : the affect was normal

## 2019-12-31 DIAGNOSIS — Z87.74 PERSONAL HISTORY OF (CORRECTED) CONGENITAL MALFORMATIONS OF HEART AND CIRCULATORY SYSTEM: ICD-10-CM

## 2020-08-13 NOTE — ED ADULT TRIAGE NOTE - PAIN RATING/NUMBER SCALE (0-10): REST
-- DO NOT REPLY / DO NOT REPLY ALL --  -- Message is from the [facility]--    COVID-19 Universal Screening: Negative    Caller is requesting an appointment - at a sooner time than what was available.      PCP unavailable for post hospital follow up    Reason for Visit: hospital follow up    Is the patient currently scheduled? No    Preferred time to be seen: any    Caller Information       Type Contact Phone    08/13/2020 02:04 PM Phone (Incoming) Akil Rodriguez ([facility]) [phone] (H)          Alternative phone number: n    Turnaround time given to caller:   \"This message will be sent to [state Provider's name]. The clinical team will fulfill your request as soon as they review your message.\"  
Pt scheduled.  
0

## 2021-01-25 ENCOUNTER — APPOINTMENT (OUTPATIENT)
Dept: CARDIOLOGY | Facility: CLINIC | Age: 41
End: 2021-01-25
Payer: COMMERCIAL

## 2021-01-25 ENCOUNTER — NON-APPOINTMENT (OUTPATIENT)
Age: 41
End: 2021-01-25

## 2021-01-25 VITALS
WEIGHT: 204 LBS | HEIGHT: 66 IN | BODY MASS INDEX: 32.78 KG/M2 | OXYGEN SATURATION: 100 % | DIASTOLIC BLOOD PRESSURE: 90 MMHG | HEART RATE: 90 BPM | RESPIRATION RATE: 16 BRPM | SYSTOLIC BLOOD PRESSURE: 129 MMHG

## 2021-01-25 DIAGNOSIS — R00.2 PALPITATIONS: ICD-10-CM

## 2021-01-25 PROCEDURE — 99213 OFFICE O/P EST LOW 20 MIN: CPT

## 2021-01-25 PROCEDURE — 99072 ADDL SUPL MATRL&STAF TM PHE: CPT

## 2021-01-25 PROCEDURE — 93000 ELECTROCARDIOGRAM COMPLETE: CPT

## 2021-01-25 RX ORDER — METOPROLOL SUCCINATE 25 MG/1
25 TABLET, EXTENDED RELEASE ORAL
Qty: 90 | Refills: 3 | Status: DISCONTINUED | COMMUNITY
Start: 2019-05-28 | End: 2021-01-25

## 2021-01-25 NOTE — PHYSICAL EXAM
[General Appearance - Well Developed] : well developed [Normal Appearance] : normal appearance [Well Groomed] : well groomed [General Appearance - Well Nourished] : well nourished [No Deformities] : no deformities [General Appearance - In No Acute Distress] : no acute distress [Normal Conjunctiva] : the conjunctiva exhibited no abnormalities [Eyelids - No Xanthelasma] : the eyelids demonstrated no xanthelasmas [Normal Oral Mucosa] : normal oral mucosa [No Oral Pallor] : no oral pallor [No Oral Cyanosis] : no oral cyanosis [Normal Jugular Venous A Waves Present] : normal jugular venous A waves present [Normal Jugular Venous V Waves Present] : normal jugular venous V waves present [No Jugular Venous Chavez A Waves] : no jugular venous chavez A waves [Respiration, Rhythm And Depth] : normal respiratory rhythm and effort [Exaggerated Use Of Accessory Muscles For Inspiration] : no accessory muscle use [Auscultation Breath Sounds / Voice Sounds] : lungs were clear to auscultation bilaterally [Heart Rate And Rhythm] : heart rate and rhythm were normal [Heart Sounds] : normal S1 and S2 [Murmurs] : no murmurs present [Abdomen Soft] : soft [Abdomen Tenderness] : non-tender [Abdomen Mass (___ Cm)] : no abdominal mass palpated [Abnormal Walk] : normal gait [Gait - Sufficient For Exercise Testing] : the gait was sufficient for exercise testing [Nail Clubbing] : no clubbing of the fingernails [Cyanosis, Localized] : no localized cyanosis [Petechial Hemorrhages (___cm)] : no petechial hemorrhages [Skin Color & Pigmentation] : normal skin color and pigmentation [] : no rash [Skin Lesions] : no skin lesions [No Venous Stasis] : no venous stasis [No Skin Ulcers] : no skin ulcer [No Xanthoma] : no  xanthoma was observed [Oriented To Time, Place, And Person] : oriented to person, place, and time [Affect] : the affect was normal [Mood] : the mood was normal [No Anxiety] : not feeling anxious

## 2021-01-25 NOTE — HISTORY OF PRESENT ILLNESS
[FreeTextEntry1] : 40 year old woman s/p CVA and PFO closure in 2019.  She has been well, without tachycardia, palpitations, chest discomfort or dyspnea.  She has gained weight due to the disruption of COVID.

## 2021-01-25 NOTE — DISCUSSION/SUMMARY
[FreeTextEntry1] : Stable post PFO closure.  We discussed diet and lifestyle changes to reduce her LDL.  She will try this and will reevaluate lipids.

## 2021-03-08 ENCOUNTER — NON-APPOINTMENT (OUTPATIENT)
Age: 41
End: 2021-03-08

## 2021-08-17 ENCOUNTER — APPOINTMENT (OUTPATIENT)
Dept: OPHTHALMOLOGY | Facility: CLINIC | Age: 41
End: 2021-08-17

## 2021-11-18 ENCOUNTER — APPOINTMENT (OUTPATIENT)
Dept: OPHTHALMOLOGY | Facility: CLINIC | Age: 41
End: 2021-11-18

## 2022-02-06 ENCOUNTER — EMERGENCY (EMERGENCY)
Facility: HOSPITAL | Age: 42
LOS: 1 days | Discharge: HOME | End: 2022-02-06
Attending: EMERGENCY MEDICINE
Payer: MEDICAID

## 2022-02-06 VITALS
SYSTOLIC BLOOD PRESSURE: 156 MMHG | TEMPERATURE: 98 F | RESPIRATION RATE: 18 BRPM | HEART RATE: 86 BPM | WEIGHT: 197.98 LBS | HEIGHT: 65 IN | DIASTOLIC BLOOD PRESSURE: 98 MMHG | OXYGEN SATURATION: 98 %

## 2022-02-06 DIAGNOSIS — Z98.891 HISTORY OF UTERINE SCAR FROM PREVIOUS SURGERY: Chronic | ICD-10-CM

## 2022-02-06 LAB
ALBUMIN SERPL ELPH-MCNC: 4.6 G/DL — SIGNIFICANT CHANGE UP (ref 3.3–5)
ALP SERPL-CCNC: 68 U/L — SIGNIFICANT CHANGE UP (ref 40–120)
ALT FLD-CCNC: 15 U/L — SIGNIFICANT CHANGE UP (ref 10–45)
ANION GAP SERPL CALC-SCNC: 14 MMOL/L — SIGNIFICANT CHANGE UP (ref 5–17)
APPEARANCE UR: CLEAR — SIGNIFICANT CHANGE UP
AST SERPL-CCNC: 7 U/L — LOW (ref 10–40)
BACTERIA # UR AUTO: NEGATIVE — SIGNIFICANT CHANGE UP
BASOPHILS # BLD AUTO: 0.06 K/UL — SIGNIFICANT CHANGE UP (ref 0–0.2)
BASOPHILS NFR BLD AUTO: 0.4 % — SIGNIFICANT CHANGE UP (ref 0–2)
BILIRUB SERPL-MCNC: <0.1 MG/DL — LOW (ref 0.2–1.2)
BILIRUB UR-MCNC: NEGATIVE — SIGNIFICANT CHANGE UP
BUN SERPL-MCNC: 12 MG/DL — SIGNIFICANT CHANGE UP (ref 7–23)
CALCIUM SERPL-MCNC: 9.5 MG/DL — SIGNIFICANT CHANGE UP (ref 8.4–10.5)
CHLORIDE SERPL-SCNC: 101 MMOL/L — SIGNIFICANT CHANGE UP (ref 96–108)
CO2 SERPL-SCNC: 23 MMOL/L — SIGNIFICANT CHANGE UP (ref 22–31)
COLOR SPEC: SIGNIFICANT CHANGE UP
CREAT SERPL-MCNC: 0.57 MG/DL — SIGNIFICANT CHANGE UP (ref 0.5–1.3)
DIFF PNL FLD: ABNORMAL
EOSINOPHIL # BLD AUTO: 0.09 K/UL — SIGNIFICANT CHANGE UP (ref 0–0.5)
EOSINOPHIL NFR BLD AUTO: 0.6 % — SIGNIFICANT CHANGE UP (ref 0–6)
EPI CELLS # UR: 1 /HPF — SIGNIFICANT CHANGE UP
GLUCOSE SERPL-MCNC: 114 MG/DL — HIGH (ref 70–99)
GLUCOSE UR QL: NEGATIVE — SIGNIFICANT CHANGE UP
HCG UR QL: NEGATIVE — SIGNIFICANT CHANGE UP
HCT VFR BLD CALC: 41.2 % — SIGNIFICANT CHANGE UP (ref 34.5–45)
HGB BLD-MCNC: 13 G/DL — SIGNIFICANT CHANGE UP (ref 11.5–15.5)
IMM GRANULOCYTES NFR BLD AUTO: 0.5 % — SIGNIFICANT CHANGE UP (ref 0–1.5)
KETONES UR-MCNC: NEGATIVE — SIGNIFICANT CHANGE UP
LEUKOCYTE ESTERASE UR-ACNC: NEGATIVE — SIGNIFICANT CHANGE UP
LYMPHOCYTES # BLD AUTO: 21.2 % — SIGNIFICANT CHANGE UP (ref 13–44)
LYMPHOCYTES # BLD AUTO: 3.28 K/UL — SIGNIFICANT CHANGE UP (ref 1–3.3)
MCHC RBC-ENTMCNC: 25.9 PG — LOW (ref 27–34)
MCHC RBC-ENTMCNC: 31.6 GM/DL — LOW (ref 32–36)
MCV RBC AUTO: 82.2 FL — SIGNIFICANT CHANGE UP (ref 80–100)
MONOCYTES # BLD AUTO: 0.66 K/UL — SIGNIFICANT CHANGE UP (ref 0–0.9)
MONOCYTES NFR BLD AUTO: 4.3 % — SIGNIFICANT CHANGE UP (ref 2–14)
NEUTROPHILS # BLD AUTO: 11.28 K/UL — HIGH (ref 1.8–7.4)
NEUTROPHILS NFR BLD AUTO: 73 % — SIGNIFICANT CHANGE UP (ref 43–77)
NITRITE UR-MCNC: NEGATIVE — SIGNIFICANT CHANGE UP
NRBC # BLD: 0 /100 WBCS — SIGNIFICANT CHANGE UP (ref 0–0)
PH UR: 6 — SIGNIFICANT CHANGE UP (ref 5–8)
PLATELET # BLD AUTO: 323 K/UL — SIGNIFICANT CHANGE UP (ref 150–400)
POTASSIUM SERPL-MCNC: 3.9 MMOL/L — SIGNIFICANT CHANGE UP (ref 3.5–5.3)
POTASSIUM SERPL-SCNC: 3.9 MMOL/L — SIGNIFICANT CHANGE UP (ref 3.5–5.3)
PROCALCITONIN SERPL-MCNC: 0.04 NG/ML — SIGNIFICANT CHANGE UP (ref 0.02–0.1)
PROT SERPL-MCNC: 7.6 G/DL — SIGNIFICANT CHANGE UP (ref 6–8.3)
PROT UR-MCNC: ABNORMAL
RBC # BLD: 5.01 M/UL — SIGNIFICANT CHANGE UP (ref 3.8–5.2)
RBC # FLD: 14.6 % — HIGH (ref 10.3–14.5)
RBC CASTS # UR COMP ASSIST: 1 /HPF — SIGNIFICANT CHANGE UP (ref 0–4)
SODIUM SERPL-SCNC: 138 MMOL/L — SIGNIFICANT CHANGE UP (ref 135–145)
SP GR SPEC: 1.02 — SIGNIFICANT CHANGE UP (ref 1.01–1.02)
UROBILINOGEN FLD QL: NEGATIVE — SIGNIFICANT CHANGE UP
WBC # BLD: 15.44 K/UL — HIGH (ref 3.8–10.5)
WBC # FLD AUTO: 15.44 K/UL — HIGH (ref 3.8–10.5)
WBC UR QL: 0 /HPF — SIGNIFICANT CHANGE UP (ref 0–5)

## 2022-02-06 PROCEDURE — 99285 EMERGENCY DEPT VISIT HI MDM: CPT

## 2022-02-06 RX ORDER — KETOROLAC TROMETHAMINE 30 MG/ML
15 SYRINGE (ML) INJECTION ONCE
Refills: 0 | Status: DISCONTINUED | OUTPATIENT
Start: 2022-02-06 | End: 2022-02-06

## 2022-02-06 RX ORDER — SODIUM CHLORIDE 9 MG/ML
1000 INJECTION, SOLUTION INTRAVENOUS
Refills: 0 | Status: DISCONTINUED | OUTPATIENT
Start: 2022-02-06 | End: 2022-02-10

## 2022-02-06 RX ADMIN — SODIUM CHLORIDE 150 MILLILITER(S): 9 INJECTION, SOLUTION INTRAVENOUS at 19:59

## 2022-02-06 RX ADMIN — Medication 15 MILLIGRAM(S): at 19:58

## 2022-02-06 NOTE — ED PROVIDER NOTE - OBJECTIVE STATEMENT
Pt is a 41yoF w/Hx of CVA w/o residual deficits, HLD,  x3 p/w abdominal pain. Yesterday her period started and pt began experiencing sharp nonradiating RLQ pain associated with nausea, took tylenol without relief, pain is different then previous period pain so pt went to urgent care today and was advised to come to ED for further evaluation. She denies HA, visual changes, hearing changes, cough/sore throat/congestion, SOB, pain with breathing, CP, palpitations, back pain, current nausea, v/d/c, dysuria/freq/urg, hematuria/hematochezia/melena, numbness/tingling, focal weakness, generalized weakness, swelling, dizziness/lightheadedness, fevers/chills, no sick contacts, no recent travel.

## 2022-02-06 NOTE — ED ADULT NURSE NOTE - NSICDXPASTMEDICALHX_GEN_ALL_CORE_FT
PAST MEDICAL HISTORY:  Cerebrovascular accident (CVA) due to embolism of other cerebral artery     Hyperlipidemia     Prediabetes

## 2022-02-06 NOTE — ED PROVIDER NOTE - PHYSICAL EXAMINATION
Gen: AAOx3, non-toxic  Head: NCAT  HEENT: EOMI, PERRLA, +oral mucosa dry, normal conjunctiva  Lung: CTAB, no respiratory distress, no wheezes/rhonchi/rales B/L, speaking in full sentences  CV: RRR, no murmurs, rubs or gallops  Abd: soft, ND, +TTP RLQ w/o rebound or guarding, +Rovsing, +McBurney's, no CVA tenderness  :  MSK: no visible deformities  Neuro: No focal sensory or motor deficits  Skin: Warm, well perfused, no rash  Psych: normal affect.   ~Vamshi Wilcox M.D. Resident Gen: AAOx3, non-toxic  Head: NCAT  HEENT: EOMI, PERRLA, +oral mucosa dry, normal conjunctiva  Lung: CTAB, no respiratory distress, no wheezes/rhonchi/rales B/L, speaking in full sentences  CV: RRR, no murmurs, rubs or gallops  Abd: soft, ND, +TTP RLQ w/o rebound or guarding, +Rovsing, +McBurney's, no CVA tenderness  Bimanual: Chaperoned by PCA Jayleen. No cervical motion tenderness, no ovarian or uterine TTP  MSK: no visible deformities  Neuro: No focal sensory or motor deficits  Skin: Warm, well perfused, no rash  Psych: normal affect.   ~Vamshi Wilcox M.D. Resident

## 2022-02-06 NOTE — ED ADULT NURSE NOTE - OBJECTIVE STATEMENT
41y Female AOx4 presents to the ED c/o RLQ pain. Pt states her LMP started yesterday along with the pain. Became concerned when Tylenol did not relieve the pain and went to UC. UC then referred pt to the ED. States the pain is intermittent and located in her RLQ radiating to the pelvic region. Reports her period is not heavy. Denies difficulty voiding/BMs, N/V, fever/chills, SOB, chest pain. Spontaneous/unlabored respirations, speaking in full sentences. Side rails up, bed in lowest position, safety maintained. [place] provided for comfort.

## 2022-02-06 NOTE — ED PROVIDER NOTE - PATIENT PORTAL LINK FT
You can access the FollowMyHealth Patient Portal offered by [facility] by registering at the following website: [email or web address]. By joining [employer]’s FollowMyHealth portal, you will also be able to view your health information using other applications (apps) compatible with our system.

## 2022-02-06 NOTE — ED PROVIDER NOTE - PROGRESS NOTE DETAILS
Attending [place]:  evaluated pt, us unremarkable. Discussed presentation, labs, us, abd exam on my eval was equivocal rlq pain, discussed w/ pt cannot say this is not early appendicitis, had sdm regarding ct scan in ed vs closely monitoring symptoms outpt. pt is amenable to ct scan in ed r/o appendicitis. Pt stable, feeling improved, requesting discharge. Discussed results of workup and return precautions with patient who verbalized understanding and agreement. Advised follow up with PMD and GI. Pt had opportunity to ask questions and address concerns, and results of workup including lab and imaging were provided in DC paperwork. Patient is medically clear for DC at this time. -Vamshi Wilcox, PGY-1

## 2022-02-06 NOTE — ED PROVIDER NOTE - ATTENDING CONTRIBUTION TO CARE
------------ATTENDING NOTE------------  pt c/o 24 hrs of crampy pelvic pain from her regular menstruation, has been worse waxing/waning in RLQ, no fevers, no nausea/vomiting, no change in BM/stools, exam mild tender R suprapubic, awaiting labs/imaging and closer reassessments -->  - Danilo Berg MD   ---------------------------------------------- ------------ATTENDING NOTE------------  pt c/o 24 hrs of crampy pelvic pain from her regular menstruation, has been worse waxing/waning in RLQ, no fevers, no nausea/vomiting, no change in BM/stools, exam mild tender R suprapubic, awaiting labs/imaging and closer reassessments --> (23:00) pain improved, soft benign abd, pending US, pt asking to eat -->  - Danilo Berg MD   ----------------------------------------------

## 2022-02-06 NOTE — ED PROVIDER NOTE - NS ED ROS FT
GENERAL: No fever or chills, EYES: no change in vision, HEENT: no trouble swallowing or speaking, CARDIAC: no chest pain, PULMONARY: no cough or SOB, GI: +abdominal pain, no nausea, no vomiting, no diarrhea or constipation, : No changes in urination, SKIN: no rashes, NEURO: no headache, MSK: No joint pain     All other ROS negative unless otherwise specified in HPI.     ~Vamshi Wilcox M.D. Resident

## 2022-02-06 NOTE — ED PROVIDER NOTE - NSFOLLOWUPINSTRUCTIONS_ED_ALL_ED_FT
You were seen and evaluated in the Emergency Department for your abdominal pain. You were evaluated clinically and with laboratory studies.    At this time your clinical evaluation and history do not demonstrate any acute, life-threatening medical conditions warranting emergent treatment. However, we strongly recommend you follow up with one of our GI consultants (or your own) for further evaluation of your symptoms by calling the following number to make an appointment:    [facility]  Gastroenterology  [address] [place] [address], [address]  [place] [address]  Phone: [phone]    Should you develop new or worsening abdominal pain, fevers, chills, nausea, vomiting, diarrhea, or constipation - please return to the ED for immediate evaluation.     We also strongly encourage you make an appointment with your Primary Care Physician for a comprehensive evaluation of your health.

## 2022-02-07 ENCOUNTER — APPOINTMENT (OUTPATIENT)
Dept: CARDIOLOGY | Facility: CLINIC | Age: 42
End: 2022-02-07
Payer: MEDICAID

## 2022-02-07 ENCOUNTER — NON-APPOINTMENT (OUTPATIENT)
Age: 42
End: 2022-02-07

## 2022-02-07 VITALS
WEIGHT: 203 LBS | OXYGEN SATURATION: 100 % | DIASTOLIC BLOOD PRESSURE: 96 MMHG | RESPIRATION RATE: 16 BRPM | HEIGHT: 66 IN | SYSTOLIC BLOOD PRESSURE: 138 MMHG | HEART RATE: 104 BPM | BODY MASS INDEX: 32.62 KG/M2

## 2022-02-07 VITALS
TEMPERATURE: 98 F | RESPIRATION RATE: 16 BRPM | SYSTOLIC BLOOD PRESSURE: 112 MMHG | HEART RATE: 77 BPM | DIASTOLIC BLOOD PRESSURE: 79 MMHG | OXYGEN SATURATION: 97 %

## 2022-02-07 DIAGNOSIS — Z87.74 PERSONAL HISTORY OF (CORRECTED) CONGENITAL MALFORMATIONS OF HEART AND CIRCULATORY SYSTEM: ICD-10-CM

## 2022-02-07 LAB
CULTURE RESULTS: SIGNIFICANT CHANGE UP
SPECIMEN SOURCE: SIGNIFICANT CHANGE UP

## 2022-02-07 PROCEDURE — 93975 VASCULAR STUDY: CPT | Mod: 26

## 2022-02-07 PROCEDURE — 93000 ELECTROCARDIOGRAM COMPLETE: CPT

## 2022-02-07 PROCEDURE — 76856 US EXAM PELVIC COMPLETE: CPT | Mod: 26,59

## 2022-02-07 PROCEDURE — 99213 OFFICE O/P EST LOW 20 MIN: CPT

## 2022-02-07 PROCEDURE — 80053 COMPREHEN METABOLIC PANEL: CPT

## 2022-02-07 PROCEDURE — 74177 CT ABD & PELVIS W/CONTRAST: CPT | Mod: 26,ME

## 2022-02-07 PROCEDURE — 81025 URINE PREGNANCY TEST: CPT

## 2022-02-07 PROCEDURE — 76830 TRANSVAGINAL US NON-OB: CPT | Mod: 26

## 2022-02-07 PROCEDURE — 85025 COMPLETE CBC W/AUTO DIFF WBC: CPT

## 2022-02-07 PROCEDURE — 36415 COLL VENOUS BLD VENIPUNCTURE: CPT

## 2022-02-07 PROCEDURE — 76856 US EXAM PELVIC COMPLETE: CPT

## 2022-02-07 PROCEDURE — 84145 PROCALCITONIN (PCT): CPT

## 2022-02-07 PROCEDURE — 87086 URINE CULTURE/COLONY COUNT: CPT

## 2022-02-07 PROCEDURE — G1004: CPT

## 2022-02-07 PROCEDURE — 76830 TRANSVAGINAL US NON-OB: CPT

## 2022-02-07 PROCEDURE — 99284 EMERGENCY DEPT VISIT MOD MDM: CPT | Mod: 25

## 2022-02-07 PROCEDURE — 93975 VASCULAR STUDY: CPT

## 2022-02-07 PROCEDURE — 81001 URINALYSIS AUTO W/SCOPE: CPT

## 2022-02-07 PROCEDURE — 74177 CT ABD & PELVIS W/CONTRAST: CPT | Mod: ME

## 2022-02-07 PROCEDURE — 96374 THER/PROPH/DIAG INJ IV PUSH: CPT | Mod: XU

## 2022-02-07 RX ADMIN — Medication 15 MILLIGRAM(S): at 00:18

## 2022-02-07 NOTE — HISTORY OF PRESENT ILLNESS
[FreeTextEntry1] : Patient s/p PFO closure in 2019.  She has been doing well with no recurrent neurological events.  No chest discomfort or dyspnea.

## 2022-02-07 NOTE — ED ADULT NURSE REASSESSMENT NOTE - NS ED NURSE REASSESS COMMENT FT1
VSS. Pt AOx4. Endorses improvement in pain at this time. Pending TVUS, pt aware. Will continue to monitor.

## 2022-02-07 NOTE — PHYSICAL EXAM
[place] AMBULATORY ENCOUNTER  OFFICE VISIT    CHIEF COMPLAINT:    Chief Complaint   Patient presents with   • Knee Pain     right       SUBJECTIVE:  Bijan Patel is a 31 year old female presents to the urgent care with a right knee injury. Patient reports slipping on wooden stairs and injuring her right knee. Exact mechanisms of injury unknown. She was able to stand and has been bearing weight but not without pain. Pain is over the anterior aspect of the knee just appeared to the patella. Non-radiating. Reports mild swelling. No ecchymosis. No history of knee surgeries. Denies any paresthesias.        PAST HISTORIES:  I have reviewed the past medical history, family history, social history, medications and allergies listed in the medical record as obtained by my nursing staff and support staff and agree with their documentation.      OBJECTIVE:  PHYSICAL EXAM:    Vital Signs:    Visit Vitals  BP (!) 153/115 (BP Location: RUE - Right upper extremity, Patient Position: Sitting, Cuff Size: Large Adult)   Pulse 96   Temp 98.4 °F (36.9 °C)   Resp 20   Ht 4' 11\" (1.499 m)   SpO2 98%   BMI 32.92 kg/m²     General:  Alert, cooperative, conversive. No acute distress.  Skin:  Warm and dry without rash.    Musculoskeletal:    Right knee: Normal alignment. Generalized tenderness over the anterior, superior aspect of the knee. No tenderness of the patella. No ecchymosis or warmth. Unable to identify effusion secondary to habitus. Unable to test ligament secondary to patient's discomfort. Pedal pulses are intact and symmetric. Sensation intact.      DIAGNOSTICS:  Xr Knee 3 Vw Right    Result Date: 12/5/2019  EXAM: XR KNEE 3 VW RIGHT INDICATION: Pain in right knee COMPARISON: None.     FINDINGS/IMPRESSION: 1. No acute fracture, dislocation, or malalignment. 2. No significant degenerative changes.         ASSESSMENT:   1. Injury of right knee, initial encounter        PLAN:  Orders Placed This Encounter   • ACE BANDAGE 5  INCH + PER YD   • XR Knee 3 View Right   • SERVICE TO ORTHOPEDICS   • naproxen (NAPROSYN) 500 MG tablet     RICE   Naproxen as needed  Pt declined crutches  F/u with orthopedics if symptoms persist  Seek immediate medical attention if symptoms worsen    The patient indicated understanding of the diagnosis and agreed with the plan of care.        Isma Gu PA-C   Collaborating physician: Dr. Galvez              [Well Developed] : well developed [Well Nourished] : well nourished [No Acute Distress] : no acute distress [Normal Conjunctiva] : normal conjunctiva [Normal Venous Pressure] : normal venous pressure [No Carotid Bruit] : no carotid bruit [Normal S1, S2] : normal S1, S2 [No Murmur] : no murmur [No Rub] : no rub [No Gallop] : no gallop [Clear Lung Fields] : clear lung fields [Good Air Entry] : good air entry [No Respiratory Distress] : no respiratory distress  [Soft] : abdomen soft [Non Tender] : non-tender [No Masses/organomegaly] : no masses/organomegaly [Normal Bowel Sounds] : normal bowel sounds [Normal Gait] : normal gait [No Edema] : no edema [No Cyanosis] : no cyanosis [No Clubbing] : no clubbing [No Varicosities] : no varicosities [No Rash] : no rash [No Skin Lesions] : no skin lesions [Moves all extremities] : moves all extremities [No Focal Deficits] : no focal deficits [Normal Speech] : normal speech [Alert and Oriented] : alert and oriented [Normal memory] : normal memory

## 2022-03-06 ENCOUNTER — EMERGENCY (EMERGENCY)
Facility: HOSPITAL | Age: 42
LOS: 1 days | Discharge: HOME | End: 2022-03-06
Attending: EMERGENCY MEDICINE | Admitting: EMERGENCY MEDICINE
Payer: MEDICAID

## 2022-03-06 VITALS
WEIGHT: 190.04 LBS | OXYGEN SATURATION: 98 % | TEMPERATURE: 99 F | RESPIRATION RATE: 20 BRPM | HEART RATE: 67 BPM | DIASTOLIC BLOOD PRESSURE: 80 MMHG | SYSTOLIC BLOOD PRESSURE: 135 MMHG | HEIGHT: 65 IN

## 2022-03-06 VITALS
OXYGEN SATURATION: 99 % | RESPIRATION RATE: 16 BRPM | SYSTOLIC BLOOD PRESSURE: 137 MMHG | TEMPERATURE: 98 F | DIASTOLIC BLOOD PRESSURE: 69 MMHG | HEART RATE: 72 BPM

## 2022-03-06 DIAGNOSIS — Z98.891 HISTORY OF UTERINE SCAR FROM PREVIOUS SURGERY: Chronic | ICD-10-CM

## 2022-03-06 LAB
ALBUMIN SERPL ELPH-MCNC: 4.7 G/DL — SIGNIFICANT CHANGE UP (ref 3.3–5)
ALP SERPL-CCNC: 68 U/L — SIGNIFICANT CHANGE UP (ref 40–120)
ALT FLD-CCNC: 13 U/L — SIGNIFICANT CHANGE UP (ref 10–45)
ANION GAP SERPL CALC-SCNC: 12 MMOL/L — SIGNIFICANT CHANGE UP (ref 5–17)
APPEARANCE UR: ABNORMAL
AST SERPL-CCNC: 14 U/L — SIGNIFICANT CHANGE UP (ref 10–40)
BACTERIA # UR AUTO: NEGATIVE — SIGNIFICANT CHANGE UP
BASOPHILS # BLD AUTO: 0.07 K/UL — SIGNIFICANT CHANGE UP (ref 0–0.2)
BASOPHILS NFR BLD AUTO: 0.5 % — SIGNIFICANT CHANGE UP (ref 0–2)
BILIRUB SERPL-MCNC: 0.2 MG/DL — SIGNIFICANT CHANGE UP (ref 0.2–1.2)
BILIRUB UR-MCNC: NEGATIVE — SIGNIFICANT CHANGE UP
BUN SERPL-MCNC: 7 MG/DL — SIGNIFICANT CHANGE UP (ref 7–23)
CALCIUM SERPL-MCNC: 9.5 MG/DL — SIGNIFICANT CHANGE UP (ref 8.4–10.5)
CHLORIDE SERPL-SCNC: 102 MMOL/L — SIGNIFICANT CHANGE UP (ref 96–108)
CO2 SERPL-SCNC: 23 MMOL/L — SIGNIFICANT CHANGE UP (ref 22–31)
COLOR SPEC: SIGNIFICANT CHANGE UP
CREAT SERPL-MCNC: 0.45 MG/DL — LOW (ref 0.5–1.3)
DIFF PNL FLD: ABNORMAL
EGFR: 125 ML/MIN/1.73M2 — SIGNIFICANT CHANGE UP
EOSINOPHIL # BLD AUTO: 0.03 K/UL — SIGNIFICANT CHANGE UP (ref 0–0.5)
EOSINOPHIL NFR BLD AUTO: 0.2 % — SIGNIFICANT CHANGE UP (ref 0–6)
EPI CELLS # UR: 2 /HPF — SIGNIFICANT CHANGE UP
GLUCOSE SERPL-MCNC: 122 MG/DL — HIGH (ref 70–99)
GLUCOSE UR QL: NEGATIVE — SIGNIFICANT CHANGE UP
HCG SERPL-ACNC: <2 MIU/ML — SIGNIFICANT CHANGE UP
HCT VFR BLD CALC: 41.5 % — SIGNIFICANT CHANGE UP (ref 34.5–45)
HGB BLD-MCNC: 13.3 G/DL — SIGNIFICANT CHANGE UP (ref 11.5–15.5)
HYALINE CASTS # UR AUTO: 0 /LPF — SIGNIFICANT CHANGE UP (ref 0–2)
IMM GRANULOCYTES NFR BLD AUTO: 0.5 % — SIGNIFICANT CHANGE UP (ref 0–1.5)
KETONES UR-MCNC: SIGNIFICANT CHANGE UP
LEUKOCYTE ESTERASE UR-ACNC: NEGATIVE — SIGNIFICANT CHANGE UP
LIDOCAIN IGE QN: 26 U/L — SIGNIFICANT CHANGE UP (ref 7–60)
LYMPHOCYTES # BLD AUTO: 1.79 K/UL — SIGNIFICANT CHANGE UP (ref 1–3.3)
LYMPHOCYTES # BLD AUTO: 11.7 % — LOW (ref 13–44)
MCHC RBC-ENTMCNC: 25.4 PG — LOW (ref 27–34)
MCHC RBC-ENTMCNC: 32 GM/DL — SIGNIFICANT CHANGE UP (ref 32–36)
MCV RBC AUTO: 79.3 FL — LOW (ref 80–100)
MONOCYTES # BLD AUTO: 0.63 K/UL — SIGNIFICANT CHANGE UP (ref 0–0.9)
MONOCYTES NFR BLD AUTO: 4.1 % — SIGNIFICANT CHANGE UP (ref 2–14)
NEUTROPHILS # BLD AUTO: 12.67 K/UL — HIGH (ref 1.8–7.4)
NEUTROPHILS NFR BLD AUTO: 83 % — HIGH (ref 43–77)
NITRITE UR-MCNC: NEGATIVE — SIGNIFICANT CHANGE UP
NRBC # BLD: 0 /100 WBCS — SIGNIFICANT CHANGE UP (ref 0–0)
PH UR: 7.5 — SIGNIFICANT CHANGE UP (ref 5–8)
PLATELET # BLD AUTO: 278 K/UL — SIGNIFICANT CHANGE UP (ref 150–400)
POTASSIUM SERPL-MCNC: 4.1 MMOL/L — SIGNIFICANT CHANGE UP (ref 3.5–5.3)
POTASSIUM SERPL-SCNC: 4.1 MMOL/L — SIGNIFICANT CHANGE UP (ref 3.5–5.3)
PROT SERPL-MCNC: 8 G/DL — SIGNIFICANT CHANGE UP (ref 6–8.3)
PROT UR-MCNC: ABNORMAL
RBC # BLD: 5.23 M/UL — HIGH (ref 3.8–5.2)
RBC # FLD: 14.2 % — SIGNIFICANT CHANGE UP (ref 10.3–14.5)
RBC CASTS # UR COMP ASSIST: 5 /HPF — HIGH (ref 0–4)
SODIUM SERPL-SCNC: 137 MMOL/L — SIGNIFICANT CHANGE UP (ref 135–145)
SP GR SPEC: 1.02 — SIGNIFICANT CHANGE UP (ref 1.01–1.02)
UROBILINOGEN FLD QL: NEGATIVE — SIGNIFICANT CHANGE UP
WBC # BLD: 15.26 K/UL — HIGH (ref 3.8–10.5)
WBC # FLD AUTO: 15.26 K/UL — HIGH (ref 3.8–10.5)
WBC UR QL: 1 /HPF — SIGNIFICANT CHANGE UP (ref 0–5)

## 2022-03-06 PROCEDURE — 87086 URINE CULTURE/COLONY COUNT: CPT

## 2022-03-06 PROCEDURE — 76830 TRANSVAGINAL US NON-OB: CPT | Mod: 26

## 2022-03-06 PROCEDURE — 99284 EMERGENCY DEPT VISIT MOD MDM: CPT | Mod: 25

## 2022-03-06 PROCEDURE — 36415 COLL VENOUS BLD VENIPUNCTURE: CPT

## 2022-03-06 PROCEDURE — 83690 ASSAY OF LIPASE: CPT

## 2022-03-06 PROCEDURE — 76857 US EXAM PELVIC LIMITED: CPT | Mod: 26,59

## 2022-03-06 PROCEDURE — 85025 COMPLETE CBC W/AUTO DIFF WBC: CPT

## 2022-03-06 PROCEDURE — 76830 TRANSVAGINAL US NON-OB: CPT

## 2022-03-06 PROCEDURE — 81001 URINALYSIS AUTO W/SCOPE: CPT

## 2022-03-06 PROCEDURE — 93975 VASCULAR STUDY: CPT | Mod: 26,59

## 2022-03-06 PROCEDURE — 80053 COMPREHEN METABOLIC PANEL: CPT

## 2022-03-06 PROCEDURE — 99285 EMERGENCY DEPT VISIT HI MDM: CPT

## 2022-03-06 PROCEDURE — 93975 VASCULAR STUDY: CPT

## 2022-03-06 PROCEDURE — 76857 US EXAM PELVIC LIMITED: CPT

## 2022-03-06 PROCEDURE — 84702 CHORIONIC GONADOTROPIN TEST: CPT

## 2022-03-06 RX ORDER — ACETAMINOPHEN 500 MG
650 TABLET ORAL ONCE
Refills: 0 | Status: COMPLETED | OUTPATIENT
Start: 2022-03-06 | End: 2022-03-06

## 2022-03-06 RX ADMIN — Medication 650 MILLIGRAM(S): at 10:18

## 2022-03-06 RX ADMIN — Medication 650 MILLIGRAM(S): at 12:23

## 2022-03-06 NOTE — ED PROVIDER NOTE - NSTIMEPROVIDERCAREINITIATE_GEN_ER
[facility]  History & Physical - Short Stay  Interventional Radiology    SUBJECTIVE:     Chief Complaint/Reason for Admission: Fall with bimalleolar fracture    Informant(s):  self and Electronic Health Record    History of Present Illness:  Sandy Townsend is a 62 y.o. female with a history of lower GI bleed.    Patient presents for mesenteric angiogram with possible intervention.    Scheduled Meds:    acetaminophen  1,000 mg Oral Q8H    diclofenac sodium  2 g Topical (Top) TID    DULoxetine  30 mg Oral Daily    enoxaparin  40 mg Subcutaneous Daily    ergocalciferol  50,000 Units Oral Q7 Days    lactated ringers  1,000 mL Intravenous Once    lactated ringers  1,000 mL Intravenous Once    polyethylene glycol  17 g Oral Daily    pravastatin  20 mg Oral QHS    senna-docusate 8.6-50 mg  2 tablet Oral BID     Continuous Infusions:   PRN Meds: sodium chloride, sodium chloride, albuterol-ipratropium, benzonatate, bisacodyl, Dextrose 10% Bolus, Dextrose 10% Bolus, dicyclomine, glucagon (human recombinant), glucose, glucose, guaiFENesin, melatonin, ondansetron, oxyCODONE, promethazine (PHENERGAN) IVPB, sodium chloride 0.9%, sodium chloride 0.9%, sodium chloride 0.9%, traMADol    Review of patient's allergies indicates:  No Known Allergies    History reviewed. No pertinent past medical history.  Past Surgical History:   Procedure Laterality Date    BACK SURGERY      FLEXIBLE SIGMOIDOSCOPY N/A 2019    Procedure: SIGMOIDOSCOPY, FLEXIBLE;  Surgeon: Nicholas Rivas MD;  Location: [address];  Service: Endoscopy;  Laterality: N/A;     History reviewed. No pertinent family history.  Social History     Tobacco Use    Smoking status: Former Smoker     Packs/day: 1.00     Types: Cigarettes     Last attempt to quit: 2019     Years since quittin.1    Smokeless tobacco: Never Used   Substance Use Topics    Alcohol use: Never     Frequency: Never    Drug use: Not on file        Review  of Systems:  ROS not obtained    OBJECTIVE:     Vital Signs (Most Recent):  Temp: 97.8 °F (36.6 °C) (12/30/19 0237)  Pulse: 96 (12/30/19 0419)  Resp: 14 (12/30/19 0419)  BP: (!) 122/56 (12/30/19 0419)  SpO2: 98 % (12/30/19 0419)    Physical Exam:  Lungs: No respiratory distress  Cardiac: regular rate and rhythm  Alert and oriented    Laboratory  CBC:   Lab Results   Component Value Date/Time    WBC 6.63 12/29/2019 09:42 PM    RBC 2.52 (L) 12/29/2019 09:42 PM    HGB 7.9 (L) 12/29/2019 09:42 PM    HCT 25.6 (L) 12/29/2019 09:42 PM    HCT 35 (L) 12/17/2019 04:41 AM     12/29/2019 09:42 PM     (H) 12/29/2019 09:42 PM    MCH 31.3 (H) 12/29/2019 09:42 PM    MCHC 30.9 (L) 12/29/2019 09:42 PM     BMP:   Lab Results   Component Value Date/Time    GLU 99 12/29/2019 04:09 AM    CO2 25 12/29/2019 04:09 AM    BUN 11 12/29/2019 04:09 AM    CREATININE 0.6 12/29/2019 04:09 AM    CALCIUM 8.9 12/29/2019 04:09 AM    MG 1.7 12/29/2019 04:09 AM     Coagulation:   Lab Results   Component Value Date/Time    INR 1.0 12/29/2019 09:42 PM         ASSESSMENT/PLAN:     Lower GI bleed.    Patient will undergo mesenteric angiogram with possible intervention.    Sedation/Anesthesia Assessment:  ASA Classification: III = Severe systemic disease limiting activity  Mallampati Score: II (hard and soft palate, upper portion of tonsils anduvula visible)    Sedation History: No problems    Sedation Plan: IV analgesia and local lidocaine   06-Mar-2022 09:49

## 2022-03-06 NOTE — ED PROVIDER NOTE - ATTENDING CONTRIBUTION TO CARE
Satish Mart MD:   I personally saw the patient and performed a substantive portion of the visit including all aspects of the medical decision making.    MDM: Patient with RLQ abd pain intermittent in nature, reports that this occurs with her menses, similar to prior ED work-up. Abd exam benign, pelvic exam benign. Declined further STD testing at this time. TVUS with evidence of endometrial thickening and adenomyosis. CBC elevated, but not changed from prior, no fever in at home or the ED. Discussed about further testing w/ CT abd, but patient declined at this time given negative CT last time with same symptoms. Patient reports that their symptoms have improved. Repeat abdominal examination shows no significant tenderness, no peritoneal signs including rebound or guarding. Patient able to tolerate PO. Stable for discharge with strict return precautions and close PMD, and OBGYN follow-up.

## 2022-03-06 NOTE — ED PROVIDER NOTE - OBJECTIVE STATEMENT
42 yo F with hx of CVA 2/2 PFO s/p repair, HLD, PSHx bilateral tubal ligation and Csx presenting with RLQ pain since last night. Patient reports starting her period last night, had mild lower abdominal cramping, took Motrin with relief. Pain became severe, described as stabbing pain at around midnight a/w nausea and an episode of vomiting. Last took Motrin at 7AM, has had some relief. Reports lighter vaginal bleeding than usual. Denies fevers/chills, urinary symptoms. Patient presented with similar 1 month ago, had normal US/CT, has not been able to follow up with her gyn since.

## 2022-03-06 NOTE — ED ADULT NURSE NOTE - OBJECTIVE STATEMENT
42yo F PMH uterine fibroids, HLD, prediabetes, presents to ED from home via EMS with c/o abdominal pain since yesterday. Pt reports starting her menstrual period yesterday morning with light cramping, took motrin, and began experiencing severe abdominal cramping at midnight last night. Last took motrin at 7 this am. Repots 1 episode of vomiting this am. Pt also reports being diagnosed with a uterine fibroid last month after experiencing similar abd pain, states the pain is worse this time. Pt denies any SOB, CP, HA, dizziness, weakness, diarrhea, urinary changes, or abnormal vaginal discharge currently. Pt is a&ox4, ambulatory, afebrile, spontaneous and unlabored respirations, abdomen soft ND referred tenderness on palpation to RLQ. Pt seen and evaluated by MD. IV placed 20G in LAC. Patient undressed and placed into gown, call bell in hand and side rails up for safety.

## 2022-03-06 NOTE — ED ADULT NURSE NOTE - NS ED NOTE ABUSE SUSPICION NEGLECT YN
Abdomen , soft, nontender, nondistended , no guarding or rigidity , no masses palpable , Liver and Spleen , no hepatomegaly present , no hepatosplenomegaly , liver nontender No

## 2022-03-06 NOTE — ED PROVIDER NOTE - NS ED ROS FT
Constitutional:  (-) fever, (-) chills, (-) fatigue  Eyes:  (-) eye pain (-) visual changes  ENMT: (-) nasal discharge, (-) sore throat. (-) neck pain or stiffness  Cardiac: (-) chest pain (-) palpitations  Respiratory:  (-) cough (-) shortness of breath  GI:  (+) nausea (+) vomiting (-) diarrhea (+) abdominal pain  :  (-) dysuria (-) frequency (-) burning  MS:  (-) back pain (-) joint pain  Neuro:  (-) headache (-) numbness (-) tingling (-) focal weakness  Skin:  (-) rash  Except as documented in the HPI,  all other systems are negative

## 2022-03-06 NOTE — ED PROVIDER NOTE - PROGRESS NOTE DETAILS
Kady, PGY2: patient reassessed - pain improved. discussed CTAP, patient prefers discharge home with gyn f/u and return precautions given asymptomatic and recent CT scan.

## 2022-03-06 NOTE — ED PROVIDER NOTE - NSFOLLOWUPINSTRUCTIONS_ED_ALL_ED_FT
You were evaluated in the Emergency Department for abdominal pain.      There are no signs of emergency conditions requiring admission to the hospital on today's workup.      We recommend that you see your OBGYN within the next week for follow up.  Bring a copy of your discharge paperwork (including any test results) to your doctor.    For pain you can take ibuprofen (Motrin, Advil) or acetaminophen (Tylenol) as needed, as directed on packaging.     ***Return to the emergency department if you have any other new/concerning symptoms, including but not limited to: worsening pain, fevers/chills, vomiting***

## 2022-03-06 NOTE — ED PROVIDER NOTE - CLINICAL SUMMARY MEDICAL DECISION MAKING FREE TEXT BOX
42 yo F with hx of CVA 2/2 PFO s/p repair, HLD, PSHx bilateral tubal ligation and Csx presenting with RLQ pain since last night. Similar presentation 1 month ago, unremarkable CT/US aside from uterine fibroid. Onset with period, progressively worsening sharp pain. Comfortable appearing with pain in RLQ, tender with palpation of LLQ. Suspect dysmenorrhea vs ruptured cyst. Less likely ovarian torsion vs TOA. Plan for labs, TVUS, pain control and reassess. Patient hesitant to have repeat CT to eval for appy. Will readdress after initial eval.

## 2022-03-06 NOTE — ED PROVIDER NOTE - PATIENT PORTAL LINK FT
You can access the FollowMyHealth Patient Portal offered by [facility] by registering at the following website: [email or web address]. By joining [email or web address]’s FollowMyHealth portal, you will also be able to view your health information using other applications (apps) compatible with our system.

## 2022-03-06 NOTE — ED PROVIDER NOTE - PHYSICAL EXAMINATION
Gen: NAD, AAOx3, non-toxic appearing  HEENT: NCAT, normal conjunctiva, oral mucosa moist  Lung: speaking in full sentences, good aeration bilaterally, lungs CTA b/l  CV: regular rate and rhythm. cap refill <2x. peripheral pulses 2+bilaterally   Abd: soft, ND, tender in RLQ with palpation of LLQ, no rebound or guarding  MSK: no visible deformities  Neuro: No focal deficits  Skin: Intact  Psych: normal affect Gen: NAD, AAOx3, non-toxic appearing  HEENT: NCAT, normal conjunctiva, oral mucosa moist  Lung: speaking in full sentences, good aeration bilaterally, lungs CTA b/l  CV: regular rate and rhythm. cap refill <2x. peripheral pulses 2+bilaterally   Abd: soft, ND, tender in RLQ with palpation of LLQ, no rebound or guarding  MSK: no visible deformities  Neuro: No focal deficits  Skin: Intact  Psych: normal affect  Pelvic: (Chaperone ED Tech Beatriz) Normal appearing external genitalia, small amount of blood in vault, no discharge, no CMT or adnexal ttp, os closed.

## 2022-03-07 LAB
CULTURE RESULTS: SIGNIFICANT CHANGE UP
SPECIMEN SOURCE: SIGNIFICANT CHANGE UP

## 2022-03-09 ENCOUNTER — APPOINTMENT (OUTPATIENT)
Dept: CARDIOLOGY | Facility: CLINIC | Age: 42
End: 2022-03-09

## 2022-04-14 ENCOUNTER — NON-APPOINTMENT (OUTPATIENT)
Age: 42
End: 2022-04-14

## 2022-04-14 ENCOUNTER — APPOINTMENT (OUTPATIENT)
Dept: OPHTHALMOLOGY | Facility: CLINIC | Age: 42
End: 2022-04-14
Payer: MEDICAID

## 2022-04-14 PROCEDURE — 92015 DETERMINE REFRACTIVE STATE: CPT | Mod: NC

## 2022-04-14 PROCEDURE — 92020 GONIOSCOPY: CPT

## 2022-04-14 PROCEDURE — 92014 COMPRE OPH EXAM EST PT 1/>: CPT

## 2022-04-15 ENCOUNTER — APPOINTMENT (OUTPATIENT)
Dept: CARDIOLOGY | Facility: CLINIC | Age: 42
End: 2022-04-15
Payer: MEDICAID

## 2022-04-15 PROCEDURE — 93306 TTE W/DOPPLER COMPLETE: CPT

## 2022-05-25 NOTE — ED ADULT TRIAGE NOTE - NS ED TRIAGE AVPU SCALE
Alert-The patient is alert, awake and responds to voice. The patient is oriented to time, place, and person. The triage nurse is able to obtain subjective information.
[Annual] : an annual visit.

## 2022-09-02 NOTE — ED CDU PROVIDER DISPOSITION NOTE - DISCHARGE DATE
08-Mar-2019 Libtayo Counseling- I discussed with the patient the risks of Libtayo including but not limited to nausea, vomiting, diarrhea, and bone or muscle pain.  The patient verbalized understanding of the proper use and possible adverse effects of Libtayo.  All of the patient's questions and concerns were addressed.

## 2022-11-28 NOTE — ED ADULT NURSE NOTE - NS PRO PASSIVE SMOKE EXP
Detail Level: Zone
Plan: Case discussed with NR. Will wait for culture results prior to prescribing. At this time, favor discoloration is secondary to mechanical disruption.
No

## 2023-01-24 ENCOUNTER — APPOINTMENT (OUTPATIENT)
Dept: CARDIOLOGY | Facility: CLINIC | Age: 43
End: 2023-01-24
Payer: MEDICAID

## 2023-01-24 ENCOUNTER — NON-APPOINTMENT (OUTPATIENT)
Age: 43
End: 2023-01-24

## 2023-01-24 VITALS
HEART RATE: 112 BPM | SYSTOLIC BLOOD PRESSURE: 133 MMHG | HEIGHT: 66 IN | RESPIRATION RATE: 16 BRPM | DIASTOLIC BLOOD PRESSURE: 100 MMHG | OXYGEN SATURATION: 100 % | WEIGHT: 201 LBS | BODY MASS INDEX: 32.3 KG/M2

## 2023-01-24 DIAGNOSIS — E78.00 PURE HYPERCHOLESTEROLEMIA, UNSPECIFIED: ICD-10-CM

## 2023-01-24 PROCEDURE — 99213 OFFICE O/P EST LOW 20 MIN: CPT | Mod: 25

## 2023-01-24 PROCEDURE — 93000 ELECTROCARDIOGRAM COMPLETE: CPT

## 2023-01-31 NOTE — REVIEW OF SYSTEMS
Report received from SILVIA Keane Report received from SILVIA Keane. Patient AOx4 presents to the ED following fall. L elbow and pelvic fracture noted. Patient notes mild pain, reporting relief. [see HPI] : see HPI [Negative] : Heme/Lymph

## 2023-02-09 PROBLEM — E78.00 HYPERCHOLESTEROLEMIA: Status: ACTIVE | Noted: 2021-01-25

## 2023-02-09 NOTE — HISTORY OF PRESENT ILLNESS
[FreeTextEntry1] : 42 year old woman with previous PFO closure for cryptogenic stroke.  Recently found to have hyperlipidemia .  She does not have chest discomfort or dyspnea.

## 2023-02-09 NOTE — DISCUSSION/SUMMARY
[FreeTextEntry1] : Young woman with HLD.  Cardiac calcium score ordered to assess need for statin therapy. [EKG obtained to assist in diagnosis and management of assessed problem(s)] : EKG obtained to assist in diagnosis and management of assessed problem(s)

## 2023-03-06 ENCOUNTER — OUTPATIENT (OUTPATIENT)
Dept: OUTPATIENT SERVICES | Facility: HOSPITAL | Age: 43
LOS: 1 days | End: 2023-03-06
Payer: SELF-PAY

## 2023-03-06 ENCOUNTER — APPOINTMENT (OUTPATIENT)
Dept: CT IMAGING | Facility: CLINIC | Age: 43
End: 2023-03-06
Payer: SELF-PAY

## 2023-03-06 DIAGNOSIS — Z98.891 HISTORY OF UTERINE SCAR FROM PREVIOUS SURGERY: Chronic | ICD-10-CM

## 2023-03-06 DIAGNOSIS — E78.00 PURE HYPERCHOLESTEROLEMIA, UNSPECIFIED: ICD-10-CM

## 2023-03-06 PROCEDURE — 75571 CT HRT W/O DYE W/CA TEST: CPT | Mod: 26

## 2023-03-06 PROCEDURE — 75571 CT HRT W/O DYE W/CA TEST: CPT

## 2023-03-09 ENCOUNTER — NON-APPOINTMENT (OUTPATIENT)
Age: 43
End: 2023-03-09

## 2023-04-25 NOTE — ED ADULT NURSE NOTE - NSSISCREENINGQ1_ED_A_ED
No
PAST MEDICAL HISTORY:  Asthma     Cardiomegaly     ESRD (end stage renal disease) on dialysis     High cholesterol     HTN (hypertension)

## 2023-12-01 NOTE — PHYSICAL EXAM
[General Appearance - Well Developed] : well developed [Normal Appearance] : normal appearance [Well Groomed] : well groomed [General Appearance - Well Nourished] : well nourished [No Deformities] : no deformities [General Appearance - In No Acute Distress] : no acute distress [Normal Conjunctiva] : the conjunctiva exhibited no abnormalities [Eyelids - No Xanthelasma] : the eyelids demonstrated no xanthelasmas [Normal Oral Mucosa] : normal oral mucosa [No Oral Cyanosis] : no oral cyanosis [No Oral Pallor] : no oral pallor [Normal Jugular Venous A Waves Present] : normal jugular venous A waves present [Normal Jugular Venous V Waves Present] : normal jugular venous V waves present [No Jugular Venous Chavez A Waves] : no jugular venous chavez A waves [Respiration, Rhythm And Depth] : normal respiratory rhythm and effort [Exaggerated Use Of Accessory Muscles For Inspiration] : no accessory muscle use [Heart Rate And Rhythm] : heart rate and rhythm were normal hide [Auscultation Breath Sounds / Voice Sounds] : lungs were clear to auscultation bilaterally [Murmurs] : no murmurs present [Heart Sounds] : normal S1 and S2 [Abdomen Soft] : soft [Abdomen Tenderness] : non-tender [Abdomen Mass (___ Cm)] : no abdominal mass palpated [Abnormal Walk] : normal gait [Gait - Sufficient For Exercise Testing] : the gait was sufficient for exercise testing [Nail Clubbing] : no clubbing of the fingernails [Cyanosis, Localized] : no localized cyanosis [Petechial Hemorrhages (___cm)] : no petechial hemorrhages [] : no rash [Skin Color & Pigmentation] : normal skin color and pigmentation [No Venous Stasis] : no venous stasis [Skin Lesions] : no skin lesions [No Skin Ulcers] : no skin ulcer [No Xanthoma] : no  xanthoma was observed [Oriented To Time, Place, And Person] : oriented to person, place, and time [Affect] : the affect was normal [No Anxiety] : not feeling anxious [Mood] : the mood was normal

## 2024-02-06 NOTE — ED ADULT NURSE NOTE - CHIEF COMPLAINT QUOTE
dizziness on and off since last week with vision change on and off since last week; was here last week and was told to have an outpatient MRI
Ambulatory

## 2024-03-18 NOTE — ED PROVIDER NOTE - NS ED ATTENDING STATEMENT MOD
Stable
I have personally seen and examined this patient.  I have fully participated in the care of this patient. I have reviewed all pertinent clinical information, including history, physical exam, plan and the Resident’s note and agree except as noted.
Her/She

## 2024-05-08 ENCOUNTER — APPOINTMENT (OUTPATIENT)
Dept: PULMONOLOGY | Facility: CLINIC | Age: 44
End: 2024-05-08

## 2024-05-14 ENCOUNTER — APPOINTMENT (OUTPATIENT)
Dept: RADIOLOGY | Facility: IMAGING CENTER | Age: 44
End: 2024-05-14
Payer: COMMERCIAL

## 2024-05-14 ENCOUNTER — OUTPATIENT (OUTPATIENT)
Dept: OUTPATIENT SERVICES | Facility: HOSPITAL | Age: 44
LOS: 1 days | End: 2024-05-14
Payer: COMMERCIAL

## 2024-05-14 DIAGNOSIS — Z00.8 ENCOUNTER FOR OTHER GENERAL EXAMINATION: ICD-10-CM

## 2024-05-14 PROCEDURE — 71046 X-RAY EXAM CHEST 2 VIEWS: CPT

## 2024-05-14 PROCEDURE — 71046 X-RAY EXAM CHEST 2 VIEWS: CPT | Mod: 26

## 2025-01-06 ENCOUNTER — NON-APPOINTMENT (OUTPATIENT)
Age: 45
End: 2025-01-06

## 2025-01-06 ENCOUNTER — APPOINTMENT (OUTPATIENT)
Dept: CARDIOLOGY | Facility: CLINIC | Age: 45
End: 2025-01-06
Payer: COMMERCIAL

## 2025-01-06 VITALS
WEIGHT: 200 LBS | HEIGHT: 66 IN | DIASTOLIC BLOOD PRESSURE: 83 MMHG | SYSTOLIC BLOOD PRESSURE: 118 MMHG | OXYGEN SATURATION: 100 % | HEART RATE: 94 BPM | BODY MASS INDEX: 32.14 KG/M2

## 2025-01-06 DIAGNOSIS — Z87.74 PERSONAL HISTORY OF (CORRECTED) CONGENITAL MALFORMATIONS OF HEART AND CIRCULATORY SYSTEM: ICD-10-CM

## 2025-01-06 PROCEDURE — 93000 ELECTROCARDIOGRAM COMPLETE: CPT

## 2025-01-06 PROCEDURE — 99213 OFFICE O/P EST LOW 20 MIN: CPT

## 2025-01-06 PROCEDURE — G2211 COMPLEX E/M VISIT ADD ON: CPT | Mod: NC

## 2025-02-25 NOTE — ED ADULT NURSE REASSESSMENT NOTE - NS ED NURSE REASSESS COMMENT FT1
Patient requesting refill on     Requested Prescriptions     Pending Prescriptions Disp Refills    amLODIPine (NORVASC) 5 MG tablet [Pharmacy Med Name: amlodipine 5 mg tablet] 90 tablet 1     Sig: TAKE 1 TABLET BY MOUTH ONCE DAILY        Last OV 1/20/2025    pt not wanting to wait for US results any longer -DR SANDHYA Eaton made aware and speaking with pt and family